# Patient Record
Sex: MALE | Race: WHITE | Employment: UNEMPLOYED | URBAN - METROPOLITAN AREA
[De-identification: names, ages, dates, MRNs, and addresses within clinical notes are randomized per-mention and may not be internally consistent; named-entity substitution may affect disease eponyms.]

---

## 2017-02-02 ENCOUNTER — OFFICE VISIT (OUTPATIENT)
Dept: FAMILY MEDICINE CLINIC | Age: 22
End: 2017-02-02

## 2017-02-02 VITALS
SYSTOLIC BLOOD PRESSURE: 133 MMHG | HEART RATE: 87 BPM | TEMPERATURE: 98 F | DIASTOLIC BLOOD PRESSURE: 77 MMHG | BODY MASS INDEX: 16.5 KG/M2 | OXYGEN SATURATION: 96 % | WEIGHT: 128.6 LBS | HEIGHT: 74 IN

## 2017-02-02 DIAGNOSIS — E55.9 VITAMIN D DEFICIENCY: ICD-10-CM

## 2017-02-02 DIAGNOSIS — Z87.11 HX OF GASTRIC ULCER: ICD-10-CM

## 2017-02-02 DIAGNOSIS — Z00.00 WELL ADULT EXAM: Primary | ICD-10-CM

## 2017-02-02 DIAGNOSIS — R63.6 UNDERWEIGHT: ICD-10-CM

## 2017-02-02 RX ORDER — EPINEPHRINE 0.15 MG/.3ML
0.15 INJECTION INTRAMUSCULAR
COMMUNITY
End: 2018-10-03 | Stop reason: DRUGHIGH

## 2017-02-02 RX ORDER — PHENOL/SODIUM PHENOLATE
AEROSOL, SPRAY (ML) MUCOUS MEMBRANE
Qty: 38 TAB | Refills: 0 | Status: SHIPPED | OUTPATIENT
Start: 2017-02-02 | End: 2018-09-24

## 2017-02-02 RX ORDER — OMEPRAZOLE/SODIUM BICARBONATE 40; 1680 MG/1; MG/1
POWDER, FOR SUSPENSION ORAL
COMMUNITY
End: 2017-02-02 | Stop reason: ALTCHOICE

## 2017-02-02 NOTE — PATIENT INSTRUCTIONS
Well Visit, Ages 25 to 48: Care Instructions  Your Care Instructions  Physical exams can help you stay healthy. Your doctor has checked your overall health and may have suggested ways to take good care of yourself. He or she also may have recommended tests. At home, you can help prevent illness with healthy eating, regular exercise, and other steps. Follow-up care is a key part of your treatment and safety. Be sure to make and go to all appointments, and call your doctor if you are having problems. It's also a good idea to know your test results and keep a list of the medicines you take. How can you care for yourself at home? · Reach and stay at a healthy weight. This will lower your risk for many problems, such as obesity, diabetes, heart disease, and high blood pressure. · Get at least 30 minutes of physical activity on most days of the week. Walking is a good choice. You also may want to do other activities, such as running, swimming, cycling, or playing tennis or team sports. Discuss any changes in your exercise program with your doctor. · Do not smoke or allow others to smoke around you. If you need help quitting, talk to your doctor about stop-smoking programs and medicines. These can increase your chances of quitting for good. · Talk to your doctor about whether you have any risk factors for sexually transmitted infections (STIs). Having one sex partner (who does not have STIs and does not have sex with anyone else) is a good way to avoid these infections. · Use birth control if you do not want to have children at this time. Talk with your doctor about the choices available and what might be best for you. · Protect your skin from too much sun. When you're outdoors from 10 a.m. to 4 p.m., stay in the shade or cover up with clothing and a hat with a wide brim. Wear sunglasses that block UV rays. Even when it's cloudy, put broad-spectrum sunscreen (SPF 30 or higher) on any exposed skin.   · See a dentist one or two times a year for checkups and to have your teeth cleaned. · Wear a seat belt in the car. · Drink alcohol in moderation, if at all. That means no more than 2 drinks a day for men and 1 drink a day for women. Follow your doctor's advice about when to have certain tests. These tests can spot problems early. For everyone  · Cholesterol. Have the fat (cholesterol) in your blood tested after age 21. Your doctor will tell you how often to have this done based on your age, family history, or other things that can increase your risk for heart disease. · Blood pressure. Have your blood pressure checked during a routine doctor visit. Your doctor will tell you how often to check your blood pressure based on your age, your blood pressure results, and other factors. · Vision. Talk with your doctor about how often to have a glaucoma test.  · Diabetes. Ask your doctor whether you should have tests for diabetes. · Colon cancer. Have a test for colon cancer at age 48. You may have one of several tests. If you are younger than 48, you may need a test earlier if you have any risk factors. Risk factors include whether you already had a precancerous polyp removed from your colon or whether your parent, brother, sister, or child has had colon cancer. For women  · Breast exam and mammogram. Talk to your doctor about when you should have a clinical breast exam and a mammogram. Medical experts differ on whether and how often women under 50 should have these tests. Your doctor can help you decide what is right for you. · Pap test and pelvic exam. Begin Pap tests at age 24. A Pap test is the best way to find cervical cancer. The test often is part of a pelvic exam. Ask how often to have this test.  · Tests for sexually transmitted infections (STIs). Ask whether you should have tests for STIs. You may be at risk if you have sex with more than one person, especially if your partners do not wear condoms.   For men  · Tests for sexually transmitted infections (STIs). Ask whether you should have tests for STIs. You may be at risk if you have sex with more than one person, especially if you do not wear a condom. · Testicular cancer exam. Ask your doctor whether you should check your testicles regularly. · Prostate exam. Talk to your doctor about whether you should have a blood test (called a PSA test) for prostate cancer. Experts differ on whether and when men should have this test. Some experts suggest it if you are older than 39 and are -American or have a father or brother who got prostate cancer when he was younger than 72. When should you call for help? Watch closely for changes in your health, and be sure to contact your doctor if you have any problems or symptoms that concern you. Where can you learn more? Go to http://sandra-ruthann.info/. Enter P072 in the search box to learn more about \"Well Visit, Ages 25 to 48: Care Instructions. \"  Current as of: July 19, 2016  Content Version: 11.1  © 4422-0686 [employer]. Care instructions adapted under license by [employer] (which disclaims liability or warranty for this information). If you have questions about a medical condition or this instruction, always ask your healthcare professional. [place] [address] any warranty or liability for your use of this information. Learning About Vitamin D  Why is it important to get enough vitamin D? Your body needs vitamin D to absorb calcium. Calcium keeps your bones and muscles, including your heart, healthy and strong. If your muscles don't get enough calcium, they can cramp, hurt, or feel weak. You may have long-term (chronic) muscle aches and pains. If you don't get enough vitamin D throughout life, you have an increased chance of having thin and brittle bones (osteoporosis) in your later years.  Children who don't get enough vitamin D may not grow as much as others their age. They also have a chance of getting a rare disease called rickets. It causes weak bones. Vitamin D and calcium are added to many foods. And your body uses sunshine to make its own vitamin D. How much vitamin D do you need? The [place] [employer] recommends that people ages 3 through 79 get 600 IU (international units) every day. Adults 71 and older need 800 IU every day. Blood tests for vitamin D can check your vitamin D level. But there is no standard normal range used by all laboratories. The [place] [employer] recommends a blood level of 20 ng/mL of vitamin D for healthy bones. And most people in the [place] and [facility] ([facility]) meet this goal.  How can you get more vitamin D? Foods that contain vitamin D include:  · [place], tuna, and mackerel. These are some of the best foods to eat when you need to get more vitamin D.  · Cheese, egg yolks, and beef liver. These foods have vitamin D in small amounts. · Milk, soy drinks, orange juice, yogurt, margarine, and some kinds of cereal have vitamin D added to them. Some people don't make vitamin D as well as others. They may have to take extra care in getting enough vitamin D. Things that reduce how much vitamin D your body makes include:  · Dark skin, such as many  Americans have. · Age, especially if you are older than 72. · Digestive problems, such as Crohn's or celiac disease. · Liver and kidney disease. Some people who do not get enough vitamin D may need supplements. Are there any risks from taking vitamin D?  · Too much vitamin D:  ¨ Can damage your kidneys. ¨ Can cause nausea and vomiting, constipation, and weakness. ¨ Raises the amount of calcium in your blood. If this happens, you can get confused or have an irregular heart rhythm. · Vitamin D may interact with other medicines. Tell your doctor about all of the medicines you take, including over-the-counter drugs, herbs, and pills.  Tell your doctor about all of your current medical problems. Where can you learn more? Go to http://sandra-ruthann.info/. Enter [id number] in the search box to learn more about \"Learning About Vitamin D.\"  Current as of: July 26, 2016  Content Version: 11.1  © 9483-8962 [employer]. Care instructions adapted under license by [employer] (which disclaims liability or warranty for this information). If you have questions about a medical condition or this instruction, always ask your healthcare professional. [place] [address] any warranty or liability for your use of this information. Learning About the HPV Vaccine  What is the HPV vaccine? The HPV (human papillomavirus) vaccine protects against HPV. HPV is a common sexually transmitted infection (STI). There are many types of HPV. Some types of the virus can cause genital warts. Other types can cause cervical and some uncommon cancers, such as anal and vaginal cancer. Cervarix, Gardasil, and Gardasil 9 are the three types of HPV vaccines. They protect against the most common HPV types that can cause serious problems. HPV vaccines are given as a series of 3 shots: a first shot followed by a second shot 1 to 2 months later, and a third shot 6 months after the first shot. Who should get the vaccine? Experts recommend that girls ages 6 to 15 get the HPV vaccine. It can be given to girls starting at age 5 or 8. It's also recommended for girls and young women ages 15 to 32 who didn't get the vaccine when they were younger. It isn't recommended for women who are pregnant. Experts recommend that the HPV vaccine be given to boys ages 6 to 15. The series can be started at age 5 or 8. Luisa Goldman men ages 15 to 24 need to get this vaccine if they didn't get it when they were younger. It is also recommended for men ages 25 to 32 who have a weak immune system or who have sex with men. What else do you need to know?   The best time for a person to get the vaccine is before becoming sexually active. This is because the vaccine works best before there is any chance of infection with HPV. When the vaccine is given at this time, it can prevent almost all infection by the types of HPV the vaccine guards against. If the person has already been infected with the virus, the vaccine may not provide protection against the virus. Having the HPV vaccine does not change your need for Pap tests. Women who have had the HPV vaccine should follow the same Pap test schedule as women who have not had the vaccine. If you are a parent of a child who's getting the shot, talk to your child about HPV and the vaccine. It's a chance to teach your child about safer sex and STIs. Having your child get the shot doesn't mean you're giving your child permission to have sex. The vaccine can have side effects. Common side effects from the vaccine include headache, fever, and redness or swelling at the site of the shot. More serious side effects, such as fainting, are rare. · Take an over-the-counter pain medicine, such as acetaminophen (Tylenol) or ibuprofen (Advil, Motrin), to relieve common side effects. Read and follow all instructions on the label. · Put ice or a cold pack on the sore area for 10 to 20 minutes at a time. Put a thin cloth between the ice and your skin. Where can you learn more? Go to http://sandra-ruthann.info/. Enter P170 in the search box to learn more about \"Learning About the HPV Vaccine. \"  Current as of: February 9, 2016  Content Version: 11.1  © 7969-3785 [employer], [facility]. Care instructions adapted under license by [employer] (which disclaims liability or warranty for this information). If you have questions about a medical condition or this instruction, always ask your healthcare professional. [address] any warranty or liability for your use of this information.

## 2017-02-02 NOTE — PROGRESS NOTES
Kavita Bourgeois  25 y.o. male  1995  [address] [facility]  <[id number]>   [address]: Progress Note  Vince Ramirez MD       Encounter Date: 2/2/2017    Chief Complaint   Patient presents with    Establish Care     History of Present Illness   Kavita Bourgeois is a 25 y.o. male who presents to clinic today for to establish care. 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 Has been on PPIs for GERD/gastric ulcer. He would like to come off this medication and is requesting guidance on this. Currently is asymptomatic. Has had prior work-up for connective tissue disorders. Review of Systems   Review of Systems   Constitutional: Negative for chills and fever. HENT: Negative for congestion and sore throat. Eyes: Negative for blurred vision and double vision. Respiratory: Negative for cough, shortness of breath and wheezing. Cardiovascular: Negative for chest pain and palpitations. Gastrointestinal: Negative for abdominal pain, constipation, diarrhea and nausea. Genitourinary: Negative for dysuria and hematuria. Musculoskeletal: Negative for back pain, falls and myalgias. Skin: Negative for rash. Neurological: Negative for dizziness, tremors and headaches. Psychiatric/Behavioral: Negative for depression. The patient is not nervous/anxious. All other systems reviewed and are negative. 99cm finger tip to spine (198cm finger tip to fingertip)  Vitals/Objective:     Vitals:    02/02/17 1306   BP: 133/77   Pulse: 87   Temp: 98 °F (36.7 °C)   TempSrc: Axillary   SpO2: 96%   Weight: 128 lb 9.6 oz (58.3 kg)   Height: 6' 2.41\" (1.89 m)     Body mass index is 16.33 kg/(m^2). Physical Exam   Constitutional: He is oriented to person, place, and time. He appears well-developed and well-nourished. No distress. HENT:   Head: Normocephalic and atraumatic.    Right Ear: Tympanic membrane and ear canal normal. Left Ear: Tympanic membrane and ear canal normal.   Nose: Nose normal.   Mouth/Throat: No oropharyngeal exudate. Eyes: Conjunctivae and EOM are normal. Pupils are equal, round, and reactive to light. No scleral icterus. Neck: Normal range of motion. Neck supple. No thyromegaly present. Cardiovascular: Normal rate, regular rhythm, normal heart sounds and intact distal pulses. Exam reveals no gallop and no friction rub. No murmur heard. Pulmonary/Chest: Effort normal and breath sounds normal. No respiratory distress. He has no wheezes. He has no rhonchi. He has no rales. Abdominal: Soft. Bowel sounds are normal. He exhibits no distension. There is no hepatosplenomegaly. There is no tenderness. There is no CVA tenderness. Musculoskeletal: Normal range of motion. Lymphadenopathy:     He has no cervical adenopathy. Neurological: He is alert and oriented to person, place, and time. He has normal reflexes. No cranial nerve deficit or sensory deficit. He exhibits normal muscle tone. Skin: Skin is warm. No rash noted. He is not diaphoretic. Psychiatric: He has a normal mood and affect. His behavior is normal. Judgment and thought content normal.   Vitals reviewed. Assessment and Plan:   1. Encounter for routine child health examination without abnormal findings  - Omeprazole delayed release (PRILOSEC D/R) 20 mg tablet; Take daily for 2 weeks then every other day for 4 weeks then every third day for 4 weeks then stop  Dispense: 38 Tab; Refill: 0  - CBC W/O DIFF  - METABOLIC PANEL, COMPREHENSIVE    2. Hx of gastric ulcer  - Omeprazole delayed release (PRILOSEC D/R) 20 mg tablet; Take daily for 2 weeks then every other day for 4 weeks then every third day for 4 weeks then stop  Dispense: 38 Tab; Refill: 0    3. Underweight  - CBC W/O DIFF  - METABOLIC PANEL, COMPREHENSIVE    4.  Vitamin D deficiency  - VITAMIN D, 25 HYDROXY    I have discussed the diagnosis with the patient and the intended plan as seen in the above orders. he has expressed understanding. The patient has received an after-visit summary and questions were answered concerning future plans. I have discussed medication side effects and warnings with the patient as well. Follow-up Disposition:  Return if symptoms worsen or fail to improve. Electronically Signed: Rey Self MD     History   Patients past medical, surgical and family histories were reviewed and updated. 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 No pertinent past surgical history. Family History   Problem Relation Age of Onset    Anxiety Mother     Depression Mother     Hypertension Father     Sleep Apnea Father     Heart Disease Father     Dementia Maternal Grandmother      Social History     Social History    Marital status: UNKNOWN     Spouse name: N/A    Number of children: N/A    Years of education: N/A     Occupational History    Not on file.      Social History Main Topics    Smoking status: Never Smoker    Smokeless tobacco: Not on file    Alcohol use Not on file    Drug use: Not on file    Sexual activity: Not on file     Other Topics Concern    Not on file     Social History Narrative    No narrative on file            Current Medications/Allergies     Allergies   Allergen Reactions    Eggshell Membrane Swelling    Hazelnut Other (comments)

## 2017-02-02 NOTE — MR AVS SNAPSHOT
Visit Information Date & Time Provider Department Dept. Phone Encounter #  
 2/2/2017  1:00 PM Vince Ramirez, [facility]5 [facility] [phone] [id number] Follow-up Instructions Return if symptoms worsen or fail to improve. Upcoming Health Maintenance Date Due  
 HPV AGE 9Y-34Y (1 of 3 - Male 3 Dose Series) 2/3/2008 INFLUENZA AGE 9 TO ADULT 8/1/2016 DTaP/Tdap/Td series (7 - Td) 1/14/2019 Allergies as of 2/2/2017  Review Complete On: 2/2/2017 By: Vince Ramirez MD  
  
 Severity Noted Reaction Type Reactions Eggshell Membrane High 02/02/2017    Swelling Hazelnut  02/02/2017   Topical Other (comments) Current Immunizations  Never Reviewed Name Date DTaP 4/13/2001, 7/6/2000, 4/13/1998, 1997, 1997 Hep A Vaccine 1/14/2009, 8/3/2006 Hep B Vaccine 1997, 1997, 1997 Hib 7/6/2000, 4/13/1998, 1997, 1997 MMR 4/13/2001, 4/13/1998 Meningococcal (MCV4P) Vaccine 2/6/2013, 1/14/2009 Poliovirus vaccine 4/13/2001, 1997, 1997, 1997 Tdap 1/14/2009 Varicella Virus Vaccine 6/9/2010, 7/10/1998 Not reviewed this visit You Were Diagnosed With   
  
 Codes Comments Encounter for routine child health examination without abnormal findings    -  Primary ICD-10-CM: W49.361 ICD-9-CM: V20.2 Hx of gastric ulcer     ICD-10-CM: Z87.19 ICD-9-CM: V12.79 Underweight     ICD-10-CM: R63.6 ICD-9-CM: 783.22 Vitamin D deficiency     ICD-10-CM: E55.9 ICD-9-CM: 268.9 Vitals BP Pulse Temp Height(growth percentile) Weight(growth percentile) SpO2  
 133/77 (74 %/ 34 %)* 87 98 °F (36.7 °C) (Axillary) 6' 2.41\" (1.89 m) (96 %, Z= 1.71) 128 lb 9.6 oz (58.3 kg) (10 %, Z= -1.29) 96% BMI Smoking Status 16.33 kg/m2 (<1 %, Z= -3.56) Never Smoker *BP percentiles are based on NHBPEP's 4th Report Growth percentiles are based on [facility] 2-20 Years data. BMI and BSA Data Body Mass Index Body Surface Area  
 16.33 kg/m 2 1.75 m 2 Preferred Pharmacy Pharmacy Name Phone [facility] [address], [facility] [address]  [facility] [phone] Your Updated Medication List  
  
   
This list is accurate as of: 2/2/17  2:04 PM.  Always use your most recent med list.  
  
  
  
  
 EPINEPHrine 0.15 mg/0.3 mL injection Commonly known as:  EPIPEN JR  
0.15 mg by IntraMUSCular route once as needed. Omeprazole delayed release 20 mg tablet Commonly known as:  PRILOSEC D/R Take daily for 2 weeks then every other day for 4 weeks then every third day for 4 weeks then stop Prescriptions Sent to Pharmacy Refills Omeprazole delayed release (PRILOSEC D/R) 20 mg tablet 0 Sig: Take daily for 2 weeks then every other day for 4 weeks then every third day for 4 weeks then stop Class: Normal  
 Pharmacy: [facility] [address], [place] - [address] AT [facility] [address] #: [phone] We Performed the Following CBC W/O DIFF [13286 CPT(R)] METABOLIC PANEL, COMPREHENSIVE [27856 CPT(R)] VITAMIN D, 25 HYDROXY [id number] CPT(R)] Follow-up Instructions Return if symptoms worsen or fail to improve. Patient Instructions Well Visit, Ages 25 to 48: Care Instructions Your Care Instructions Physical exams can help you stay healthy. Your doctor has checked your overall health and may have suggested ways to take good care of yourself. He or she also may have recommended tests. At home, you can help prevent illness with healthy eating, regular exercise, and other steps. Follow-up care is a key part of your treatment and safety. Be sure to make and go to all appointments, and call your doctor if you are having problems. It's also a good idea to know your test results and keep a list of the medicines you take. How can you care for yourself at home? · Reach and stay at a healthy weight. This will lower your risk for many problems, such as obesity, diabetes, heart disease, and high blood pressure. · Get at least 30 minutes of physical activity on most days of the week. Walking is a good choice. You also may want to do other activities, such as running, swimming, cycling, or playing tennis or team sports. Discuss any changes in your exercise program with your doctor. · Do not smoke or allow others to smoke around you. If you need help quitting, talk to your doctor about stop-smoking programs and medicines. These can increase your chances of quitting for good. · Talk to your doctor about whether you have any risk factors for sexually transmitted infections (STIs). Having one sex partner (who does not have STIs and does not have sex with anyone else) is a good way to avoid these infections. · Use birth control if you do not want to have children at this time. Talk with your doctor about the choices available and what might be best for you. · Protect your skin from too much sun. When you're outdoors from 10 a.m. to 4 p.m., stay in the shade or cover up with clothing and a hat with a wide brim. Wear sunglasses that block UV rays. Even when it's cloudy, put broad-spectrum sunscreen (SPF 30 or higher) on any exposed skin. · See a dentist one or two times a year for checkups and to have your teeth cleaned. · Wear a seat belt in the car. · Drink alcohol in moderation, if at all. That means no more than 2 drinks a day for men and 1 drink a day for women. Follow your doctor's advice about when to have certain tests. These tests can spot problems early. For everyone · Cholesterol. Have the fat (cholesterol) in your blood tested after age 21. Your doctor will tell you how often to have this done based on your age, family history, or other things that can increase your risk for heart disease. · Blood pressure. Have your blood pressure checked during a routine doctor visit. Your doctor will tell you how often to check your blood pressure based on your age, your blood pressure results, and other factors. · Vision. Talk with your doctor about how often to have a glaucoma test. 
· Diabetes. Ask your doctor whether you should have tests for diabetes. · Colon cancer. Have a test for colon cancer at age 48. You may have one of several tests. If you are younger than 48, you may need a test earlier if you have any risk factors. Risk factors include whether you already had a precancerous polyp removed from your colon or whether your parent, brother, sister, or child has had colon cancer. For women · Breast exam and mammogram. Talk to your doctor about when you should have a clinical breast exam and a mammogram. Medical experts differ on whether and how often women under 50 should have these tests. Your doctor can help you decide what is right for you. · Pap test and pelvic exam. Begin Pap tests at age 24. A Pap test is the best way to find cervical cancer. The test often is part of a pelvic exam. Ask how often to have this test. 
· Tests for sexually transmitted infections (STIs). Ask whether you should have tests for STIs. You may be at risk if you have sex with more than one person, especially if your partners do not wear condoms. For men · Tests for sexually transmitted infections (STIs). Ask whether you should have tests for STIs. You may be at risk if you have sex with more than one person, especially if you do not wear a condom. · Testicular cancer exam. Ask your doctor whether you should check your testicles regularly. · Prostate exam. Talk to your doctor about whether you should have a blood test (called a PSA test) for prostate cancer.  Experts differ on whether and when men should have this test. Some experts suggest it if you are older than 39 and are -American or have a father or brother who got prostate cancer when he was younger than 72. When should you call for help? Watch closely for changes in your health, and be sure to contact your doctor if you have any problems or symptoms that concern you. Where can you learn more? Go to http://sandra-ruthann.info/. Enter P072 in the search box to learn more about \"Well Visit, Ages 25 to 48: Care Instructions. \" Current as of: July 19, 2016 Content Version: 11.1 © 9251-4128 [employer]. Care instructions adapted under license by [employer] (which disclaims liability or warranty for this information). If you have questions about a medical condition or this instruction, always ask your healthcare professional. [address] any warranty or liability for your use of this information. Learning About Vitamin D Why is it important to get enough vitamin D? Your body needs vitamin D to absorb calcium. Calcium keeps your bones and muscles, including your heart, healthy and strong. If your muscles don't get enough calcium, they can cramp, hurt, or feel weak. You may have long-term (chronic) muscle aches and pains. If you don't get enough vitamin D throughout life, you have an increased chance of having thin and brittle bones (osteoporosis) in your later years. Children who don't get enough vitamin D may not grow as much as others their age. They also have a chance of getting a rare disease called rickets. It causes weak bones. Vitamin D and calcium are added to many foods. And your body uses sunshine to make its own vitamin D. How much vitamin D do you need? The [place] of Medicine recommends that people ages 3 through 79 get 600 IU (international units) every day. Adults 71 and older need 800 IU every day. Blood tests for vitamin D can check your vitamin D level.  But there is no standard normal range used by all laboratories. The [place] of Medicine recommends a blood level of 20 ng/mL of vitamin D for healthy bones. And most people in the [place] and [facility] meet this goal. 
How can you get more vitamin D? Foods that contain vitamin D include: 
· Duarte, tuna, and mackerel. These are some of the best foods to eat when you need to get more vitamin D. 
· Cheese, egg yolks, and beef liver. These foods have vitamin D in small amounts. · Milk, soy drinks, orange juice, yogurt, margarine, and some kinds of cereal have vitamin D added to them. Some people don't make vitamin D as well as others. They may have to take extra care in getting enough vitamin D. Things that reduce how much vitamin D your body makes include: · Dark skin, such as many  Americans have. · Age, especially if you are older than 72. · Digestive problems, such as Crohn's or celiac disease. · Liver and kidney disease. Some people who do not get enough vitamin D may need supplements. Are there any risks from taking vitamin D? 
· Too much vitamin D: 
¨ Can damage your kidneys. ¨ Can cause nausea and vomiting, constipation, and weakness. ¨ Raises the amount of calcium in your blood. If this happens, you can get confused or have an irregular heart rhythm. · Vitamin D may interact with other medicines. Tell your doctor about all of the medicines you take, including over-the-counter drugs, herbs, and pills. Tell your doctor about all of your current medical problems. Where can you learn more? Go to http://sandra-ruthann.info/. Enter [id number] in the search box to learn more about \"Learning About Vitamin D.\" 
Current as of: July 26, 2016 Content Version: 11.1 © 9140-2693 [employer]. Care instructions adapted under license by [employer] (which disclaims liability or warranty for this information).  If you have questions about a medical condition or this instruction, always ask your healthcare professional. [place] [address] any warranty or liability for your use of this information. Learning About the HPV Vaccine What is the HPV vaccine? The HPV (human papillomavirus) vaccine protects against HPV. HPV is a common sexually transmitted infection (STI). There are many types of HPV. Some types of the virus can cause genital warts. Other types can cause cervical and some uncommon cancers, such as anal and vaginal cancer. Cervarix, Gardasil, and Gardasil 9 are the three types of HPV vaccines. They protect against the most common HPV types that can cause serious problems. HPV vaccines are given as a series of 3 shots: a first shot followed by a second shot 1 to 2 months later, and a third shot 6 months after the first shot. Who should get the vaccine? Experts recommend that girls ages 6 to 15 get the HPV vaccine. It can be given to girls starting at age 5 or 8. It's also recommended for girls and young women ages 15 to 32 who didn't get the vaccine when they were younger. It isn't recommended for women who are pregnant. Experts recommend that the HPV vaccine be given to boys ages 6 to 15. The series can be started at age 5 or 8. Shabnam Spann men ages 15 to 24 need to get this vaccine if they didn't get it when they were younger. It is also recommended for men ages 25 to 32 who have a weak immune system or who have sex with men. What else do you need to know? The best time for a person to get the vaccine is before becoming sexually active. This is because the vaccine works best before there is any chance of infection with HPV. When the vaccine is given at this time, it can prevent almost all infection by the types of HPV the vaccine guards against. If the person has already been infected with the virus, the vaccine may not provide protection against the virus. Having the HPV vaccine does not change your need for Pap tests.  Women who have had the HPV vaccine should follow the same Pap test schedule as women who have not had the vaccine. If you are a parent of a child who's getting the shot, talk to your child about HPV and the vaccine. It's a chance to teach your child about safer sex and STIs. Having your child get the shot doesn't mean you're giving your child permission to have sex. The vaccine can have side effects. Common side effects from the vaccine include headache, fever, and redness or swelling at the site of the shot. More serious side effects, such as fainting, are rare. · Take an over-the-counter pain medicine, such as acetaminophen (Tylenol) or ibuprofen (Advil, Motrin), to relieve common side effects. Read and follow all instructions on the label. · Put ice or a cold pack on the sore area for 10 to 20 minutes at a time. Put a thin cloth between the ice and your skin. Where can you learn more? Go to http://sandra-ruthann.info/. Enter M590 in the search box to learn more about \"Learning About the HPV Vaccine. \" Current as of: February 9, 2016 Content Version: 11.1 © 4708-6270 [employer]looks (Coqueta.me). Care instructions adapted under license by [employer] (which disclaims liability or warranty for this information). If you have questions about a medical condition or this instruction, always ask your healthcare professional. [address] any warranty or liability for your use of this information. Introducing \[facility] [place] [facility]\"" & HEALTH SERVICES! Renee Baker introduces [employer] patient portal. Now you can access parts of your medical record, email your doctor's office, and request medication refills online. 1. In your internet browser, go to [email or web address] 2. Click on the First Time User? Click Here link in the Sign In box. You will see the New Member Sign Up page. 3. Enter your [employer] Access Code exactly as it appears below.  You will not need to use this code after youve completed the sign-up process. If you do not sign up before the expiration date, you must request a new code. · [employer] Access Code: [email or web address] Expires: 5/3/2017  2:04 PM 
 
4. Enter the last four digits of your Social Security Number (xxxx) and Date of Birth (mm/dd/yyyy) as indicated and click Submit. You will be taken to the next sign-up page. 5. Create a [employer] ID. This will be your [employer] login ID and cannot be changed, so think of one that is secure and easy to remember. 6. Create a [employer] password. You can change your password at any time. 7. Enter your Password Reset Question and Answer. This can be used at a later time if you forget your password. 8. Enter your e-mail address. You will receive e-mail notification when new information is available in [address]. 9. Click Sign Up. You can now view and download portions of your medical record. 10. Click the Download Summary menu link to download a portable copy of your medical information. If you have questions, please visit the Frequently Asked Questions section of the [employer] website. Remember, [employer] is NOT to be used for urgent needs. For medical emergencies, dial 911. Now available from your iPhone and Android! Please provide this summary of care documentation to your next provider. If you have any questions after today's visit, please call [phone].

## 2017-02-02 NOTE — PROGRESS NOTES
Chief Complaint   Patient presents with   [employer] Establish Care     1. Have you been to the ER, urgent care clinic since your last visit? Hospitalized since your last visit? No    2. Have you seen or consulted any other health care providers outside of the [address] [place] [address] since your last visit? Include any pap smears or colon screening.  No

## 2017-02-02 NOTE — PROGRESS NOTES
Chief Complaint   Patient presents with   [facility]     1. Have you been to the ER, urgent care clinic since your last visit? Hospitalized since your last visit? No    2. Have you seen or consulted any other health care providers outside of the [address] [place] [address] since your last visit? Include any pap smears or colon screening.  No     Just moved to [place]    Wants to check vit d levels    Taking omeprazole --had ulcer surgery 2015--

## 2017-02-03 ENCOUNTER — TELEPHONE (OUTPATIENT)
Dept: FAMILY MEDICINE CLINIC | Age: 22
End: 2017-02-03

## 2017-02-03 LAB
25(OH)D3+25(OH)D2 SERPL-MCNC: 22.2 NG/ML (ref 30–100)
ALBUMIN SERPL-MCNC: 4.7 G/DL (ref 3.5–5.5)
ALBUMIN/GLOB SERPL: 1.9 {RATIO} (ref 1.1–2.5)
ALP SERPL-CCNC: 85 IU/L (ref 39–117)
ALT SERPL-CCNC: 11 IU/L (ref 0–44)
AST SERPL-CCNC: 22 IU/L (ref 0–40)
BILIRUB SERPL-MCNC: 0.3 MG/DL (ref 0–1.2)
BUN SERPL-MCNC: 11 MG/DL (ref 6–20)
BUN/CREAT SERPL: 12 (ref 8–19)
CALCIUM SERPL-MCNC: 10.1 MG/DL (ref 8.7–10.2)
CHLORIDE SERPL-SCNC: 102 MMOL/L (ref 96–106)
CO2 SERPL-SCNC: 26 MMOL/L (ref 18–29)
CREAT SERPL-MCNC: 0.94 MG/DL (ref 0.76–1.27)
ERYTHROCYTE [DISTWIDTH] IN BLOOD BY AUTOMATED COUNT: 13.5 % (ref 12.3–15.4)
GLOBULIN SER CALC-MCNC: 2.5 G/DL (ref 1.5–4.5)
GLUCOSE SERPL-MCNC: 89 MG/DL (ref 65–99)
HCT VFR BLD AUTO: 46 % (ref 37.5–51)
HGB BLD-MCNC: 16.1 G/DL (ref 12.6–17.7)
MCH RBC QN AUTO: 31.6 PG (ref 26.6–33)
MCHC RBC AUTO-ENTMCNC: 35 G/DL (ref 31.5–35.7)
MCV RBC AUTO: 90 FL (ref 79–97)
PLATELET # BLD AUTO: 204 X10E3/UL (ref 150–379)
POTASSIUM SERPL-SCNC: 4.8 MMOL/L (ref 3.5–5.2)
PROT SERPL-MCNC: 7.2 G/DL (ref 6–8.5)
RBC # BLD AUTO: 5.1 X10E6/UL (ref 4.14–5.8)
SODIUM SERPL-SCNC: 144 MMOL/L (ref 134–144)
WBC # BLD AUTO: 5.2 X10E3/UL (ref 3.4–10.8)

## 2017-02-03 RX ORDER — ACETAMINOPHEN 500 MG
2000 TABLET ORAL 2 TIMES DAILY
Qty: 60 CAP | Refills: 1 | Status: SHIPPED | OUTPATIENT
Start: 2017-02-03 | End: 2018-09-24

## 2017-02-03 NOTE — TELEPHONE ENCOUNTER
Please notify Kareem:    1) Happy Birthday  2) His vitamin D level was low and I recommend replacement for the next 8 weeks. A prescription was sent to his pharmacy.

## 2017-05-04 ENCOUNTER — TELEPHONE (OUTPATIENT)
Dept: FAMILY MEDICINE CLINIC | Age: 22
End: 2017-05-04

## 2017-05-04 DIAGNOSIS — R79.89 LOW SERUM VITAMIN D: Primary | ICD-10-CM

## 2017-05-04 NOTE — TELEPHONE ENCOUNTER
Patient's mom Miguel Angel Walker on HIPAA) states that the patient was prescribed Vitamin D and she would like to know if he could get a blood test to make sure that the supplement is working effectively and to check if his levels have increased? Call back # [phone]. Thank you!

## 2018-09-24 ENCOUNTER — APPOINTMENT (OUTPATIENT)
Dept: GENERAL RADIOLOGY | Age: 23
DRG: 166 | End: 2018-09-24
Attending: EMERGENCY MEDICINE
Payer: OTHER GOVERNMENT

## 2018-09-24 ENCOUNTER — APPOINTMENT (OUTPATIENT)
Dept: CT IMAGING | Age: 23
DRG: 166 | End: 2018-09-24
Attending: EMERGENCY MEDICINE
Payer: OTHER GOVERNMENT

## 2018-09-24 ENCOUNTER — APPOINTMENT (OUTPATIENT)
Dept: GENERAL RADIOLOGY | Age: 23
DRG: 166 | End: 2018-09-24
Attending: THORACIC SURGERY (CARDIOTHORACIC VASCULAR SURGERY)
Payer: OTHER GOVERNMENT

## 2018-09-24 ENCOUNTER — HOSPITAL ENCOUNTER (INPATIENT)
Age: 23
LOS: 3 days | Discharge: HOME | DRG: 166 | End: 2018-09-27
Attending: EMERGENCY MEDICINE | Admitting: THORACIC SURGERY (CARDIOTHORACIC VASCULAR SURGERY)
Payer: OTHER GOVERNMENT

## 2018-09-24 ENCOUNTER — OFFICE VISIT (OUTPATIENT)
Dept: FAMILY MEDICINE CLINIC | Age: 23
End: 2018-09-24

## 2018-09-24 VITALS
SYSTOLIC BLOOD PRESSURE: 112 MMHG | WEIGHT: 129 LBS | TEMPERATURE: 97.1 F | BODY MASS INDEX: 16.55 KG/M2 | HEART RATE: 93 BPM | DIASTOLIC BLOOD PRESSURE: 68 MMHG | HEIGHT: 74 IN | RESPIRATION RATE: 18 BRPM | OXYGEN SATURATION: 98 %

## 2018-09-24 DIAGNOSIS — J93.11 PRIMARY SPONTANEOUS PNEUMOTHORAX: Primary | ICD-10-CM

## 2018-09-24 DIAGNOSIS — G89.18 POST-OP PAIN: ICD-10-CM

## 2018-09-24 DIAGNOSIS — Q67.7 PECTUS CARINATUM: ICD-10-CM

## 2018-09-24 DIAGNOSIS — R29.91 MARFANOID HABITUS: ICD-10-CM

## 2018-09-24 PROBLEM — J93.83 SPONTANEOUS PNEUMOTHORAX: Status: ACTIVE | Noted: 2018-09-24

## 2018-09-24 LAB
ABO + RH BLD: NORMAL
ALBUMIN SERPL-MCNC: 4.6 G/DL (ref 3.5–5)
ALBUMIN/GLOB SERPL: 1.3 {RATIO} (ref 1.1–2.2)
ALP SERPL-CCNC: 84 U/L (ref 45–117)
ALT SERPL-CCNC: 25 U/L (ref 12–78)
ANION GAP SERPL CALC-SCNC: 5 MMOL/L (ref 5–15)
AST SERPL-CCNC: 23 U/L (ref 15–37)
ATRIAL RATE: 64 BPM
BASOPHILS # BLD: 0 K/UL (ref 0–0.1)
BASOPHILS NFR BLD: 1 % (ref 0–1)
BILIRUB SERPL-MCNC: 0.5 MG/DL (ref 0.2–1)
BLOOD GROUP ANTIBODIES SERPL: NORMAL
BUN SERPL-MCNC: 14 MG/DL (ref 6–20)
BUN/CREAT SERPL: 15 (ref 12–20)
CALCIUM SERPL-MCNC: 9 MG/DL (ref 8.5–10.1)
CALCULATED P AXIS, ECG09: 66 DEGREES
CALCULATED R AXIS, ECG10: 77 DEGREES
CALCULATED T AXIS, ECG11: 75 DEGREES
CHLORIDE SERPL-SCNC: 104 MMOL/L (ref 97–108)
CO2 SERPL-SCNC: 29 MMOL/L (ref 21–32)
COMMENT, HOLDF: NORMAL
CREAT SERPL-MCNC: 0.96 MG/DL (ref 0.7–1.3)
DIAGNOSIS, 93000: NORMAL
DIFFERENTIAL METHOD BLD: NORMAL
EOSINOPHIL # BLD: 0.1 K/UL (ref 0–0.4)
EOSINOPHIL NFR BLD: 2 % (ref 0–7)
ERYTHROCYTE [DISTWIDTH] IN BLOOD BY AUTOMATED COUNT: 11.9 % (ref 11.5–14.5)
GLOBULIN SER CALC-MCNC: 3.6 G/DL (ref 2–4)
GLUCOSE BLD STRIP.AUTO-MCNC: 100 MG/DL (ref 65–100)
GLUCOSE SERPL-MCNC: 80 MG/DL (ref 65–100)
HCT VFR BLD AUTO: 47.6 % (ref 36.6–50.3)
HGB BLD-MCNC: 16.3 G/DL (ref 12.1–17)
IMM GRANULOCYTES # BLD: 0 K/UL (ref 0–0.04)
IMM GRANULOCYTES NFR BLD AUTO: 0 % (ref 0–0.5)
LYMPHOCYTES # BLD: 1.8 K/UL (ref 0.8–3.5)
LYMPHOCYTES NFR BLD: 29 % (ref 12–49)
MCH RBC QN AUTO: 31.3 PG (ref 26–34)
MCHC RBC AUTO-ENTMCNC: 34.2 G/DL (ref 30–36.5)
MCV RBC AUTO: 91.4 FL (ref 80–99)
MONOCYTES # BLD: 0.4 K/UL (ref 0–1)
MONOCYTES NFR BLD: 6 % (ref 5–13)
NEUTS SEG # BLD: 3.9 K/UL (ref 1.8–8)
NEUTS SEG NFR BLD: 62 % (ref 32–75)
NRBC # BLD: 0 K/UL (ref 0–0.01)
NRBC BLD-RTO: 0 PER 100 WBC
P-R INTERVAL, ECG05: 116 MS
PLATELET # BLD AUTO: 222 K/UL (ref 150–400)
PMV BLD AUTO: 10.6 FL (ref 8.9–12.9)
POTASSIUM SERPL-SCNC: 4 MMOL/L (ref 3.5–5.1)
PROT SERPL-MCNC: 8.2 G/DL (ref 6.4–8.2)
Q-T INTERVAL, ECG07: 376 MS
QRS DURATION, ECG06: 78 MS
QTC CALCULATION (BEZET), ECG08: 387 MS
RBC # BLD AUTO: 5.21 M/UL (ref 4.1–5.7)
SAMPLES BEING HELD,HOLD: NORMAL
SERVICE CMNT-IMP: NORMAL
SODIUM SERPL-SCNC: 138 MMOL/L (ref 136–145)
SPECIMEN EXP DATE BLD: NORMAL
VENTRICULAR RATE, ECG03: 64 BPM
WBC # BLD AUTO: 6.2 K/UL (ref 4.1–11.1)

## 2018-09-24 PROCEDURE — 71045 X-RAY EXAM CHEST 1 VIEW: CPT

## 2018-09-24 PROCEDURE — 74011250636 HC RX REV CODE- 250/636: Performed by: EMERGENCY MEDICINE

## 2018-09-24 PROCEDURE — 36415 COLL VENOUS BLD VENIPUNCTURE: CPT | Performed by: THORACIC SURGERY (CARDIOTHORACIC VASCULAR SURGERY)

## 2018-09-24 PROCEDURE — 71250 CT THORAX DX C-: CPT

## 2018-09-24 PROCEDURE — 93005 ELECTROCARDIOGRAM TRACING: CPT

## 2018-09-24 PROCEDURE — 75810000165 HC THORACENTESIS

## 2018-09-24 PROCEDURE — 75810000062 HC CHEST TUBE INSERTION

## 2018-09-24 PROCEDURE — 77030037656 HC DRN CHST TU PLEURAGLD KT GTNG -B

## 2018-09-24 PROCEDURE — 65660000000 HC RM CCU STEPDOWN

## 2018-09-24 PROCEDURE — 96361 HYDRATE IV INFUSION ADD-ON: CPT

## 2018-09-24 PROCEDURE — 77030012390 HC DRN CHST BTL GTNG -B

## 2018-09-24 PROCEDURE — 96374 THER/PROPH/DIAG INJ IV PUSH: CPT

## 2018-09-24 PROCEDURE — 82962 GLUCOSE BLOOD TEST: CPT

## 2018-09-24 PROCEDURE — 74011250636 HC RX REV CODE- 250/636: Performed by: THORACIC SURGERY (CARDIOTHORACIC VASCULAR SURGERY)

## 2018-09-24 PROCEDURE — 80053 COMPREHEN METABOLIC PANEL: CPT | Performed by: EMERGENCY MEDICINE

## 2018-09-24 PROCEDURE — 0W9B30Z DRAINAGE OF LEFT PLEURAL CAVITY WITH DRAINAGE DEVICE, PERCUTANEOUS APPROACH: ICD-10-PCS | Performed by: EMERGENCY MEDICINE

## 2018-09-24 PROCEDURE — 85025 COMPLETE CBC W/AUTO DIFF WBC: CPT | Performed by: EMERGENCY MEDICINE

## 2018-09-24 PROCEDURE — C1729 CATH, DRAINAGE: HCPCS

## 2018-09-24 PROCEDURE — 99285 EMERGENCY DEPT VISIT HI MDM: CPT

## 2018-09-24 PROCEDURE — 86900 BLOOD TYPING SEROLOGIC ABO: CPT | Performed by: THORACIC SURGERY (CARDIOTHORACIC VASCULAR SURGERY)

## 2018-09-24 RX ORDER — MORPHINE SULFATE 2 MG/ML
2 INJECTION, SOLUTION INTRAMUSCULAR; INTRAVENOUS
Status: COMPLETED | OUTPATIENT
Start: 2018-09-24 | End: 2018-09-24

## 2018-09-24 RX ORDER — ONDANSETRON 2 MG/ML
4 INJECTION INTRAMUSCULAR; INTRAVENOUS
Status: DISCONTINUED | OUTPATIENT
Start: 2018-09-24 | End: 2018-09-27 | Stop reason: HOSPADM

## 2018-09-24 RX ORDER — LIDOCAINE HYDROCHLORIDE 10 MG/ML
10 INJECTION INFILTRATION; PERINEURAL
Status: COMPLETED | OUTPATIENT
Start: 2018-09-24 | End: 2018-09-24

## 2018-09-24 RX ORDER — KETOROLAC TROMETHAMINE 30 MG/ML
15 INJECTION, SOLUTION INTRAMUSCULAR; INTRAVENOUS
Status: COMPLETED | OUTPATIENT
Start: 2018-09-24 | End: 2018-09-24

## 2018-09-24 RX ORDER — OXYCODONE AND ACETAMINOPHEN 5; 325 MG/1; MG/1
2 TABLET ORAL
Status: DISCONTINUED | OUTPATIENT
Start: 2018-09-24 | End: 2018-09-27

## 2018-09-24 RX ORDER — ACETAMINOPHEN 10 MG/ML
1000 INJECTION, SOLUTION INTRAVENOUS EVERY 6 HOURS
Status: DISCONTINUED | OUTPATIENT
Start: 2018-09-24 | End: 2018-09-25

## 2018-09-24 RX ORDER — KETOROLAC TROMETHAMINE 30 MG/ML
30 INJECTION, SOLUTION INTRAMUSCULAR; INTRAVENOUS ONCE
Status: COMPLETED | OUTPATIENT
Start: 2018-09-24 | End: 2018-09-24

## 2018-09-24 RX ORDER — SODIUM CHLORIDE 0.9 % (FLUSH) 0.9 %
10 SYRINGE (ML) INJECTION
Status: COMPLETED | OUTPATIENT
Start: 2018-09-24 | End: 2018-09-24

## 2018-09-24 RX ORDER — FENTANYL CITRATE 50 UG/ML
25 INJECTION, SOLUTION INTRAMUSCULAR; INTRAVENOUS
Status: DISCONTINUED | OUTPATIENT
Start: 2018-09-24 | End: 2018-09-25

## 2018-09-24 RX ADMIN — SODIUM CHLORIDE 1000 ML: 900 INJECTION, SOLUTION INTRAVENOUS at 16:53

## 2018-09-24 RX ADMIN — MORPHINE SULFATE 2 MG: 2 INJECTION, SOLUTION INTRAMUSCULAR; INTRAVENOUS at 18:18

## 2018-09-24 RX ADMIN — SODIUM CHLORIDE 1000 ML: 900 INJECTION, SOLUTION INTRAVENOUS at 19:46

## 2018-09-24 RX ADMIN — MORPHINE SULFATE 2 MG: 2 INJECTION, SOLUTION INTRAMUSCULAR; INTRAVENOUS at 18:57

## 2018-09-24 RX ADMIN — KETOROLAC TROMETHAMINE 15 MG: 30 INJECTION, SOLUTION INTRAMUSCULAR at 20:47

## 2018-09-24 RX ADMIN — LIDOCAINE HYDROCHLORIDE 10 ML: 10 INJECTION, SOLUTION INFILTRATION; PERINEURAL at 17:30

## 2018-09-24 RX ADMIN — SODIUM CHLORIDE 1000 ML: 900 INJECTION, SOLUTION INTRAVENOUS at 16:54

## 2018-09-24 RX ADMIN — KETOROLAC TROMETHAMINE 30 MG: 30 INJECTION, SOLUTION INTRAMUSCULAR at 23:21

## 2018-09-24 RX ADMIN — Medication 10 ML: at 19:43

## 2018-09-24 NOTE — PROGRESS NOTES
[address] Office [phone], Fax [phone] Subjective: Chief Complaint Patient presents with  Rib Pain  
  left History provided by patient HPI: 
Gayle Zacarias is a 26 y.o. WHITE OR  male presents for LLQ abdominal/rib pain. Significant history includes hx of ulcer s/p surgery and pectus carinatum. LUQ-LLQ abdominal/rib pain  
- Started about 1.5 week as gas pressure (took magnesium, thought it was better), and for the past 3 days, it has gotten worse and is mainly exacerbated by walking or lifting. Denies fever/chills, constipation/diarrhea, n/v, trauma, heavy lifting, change in activity.  
- In 2015, pt had an stomach/GI ulcer with perforation and had emergent surgery in [place]. Negative H pylori. Pt had normal EGD on August 2015 at [facility].  
 
Medication reviewed. Allergy reviewed. ROS (bolded are positive):  
General Negative for fever, chills, changes in weight, changes in appetite CV Negative for chest pain, palpitations, edema Respiratory Negative for cough, shortness of breath, wheezing GI Negative for change in bowel habits, abdominal pain, black or bloody stools, nausea or vomiting MSK Negative for back pain, joint pain, muscle pain Neuro Negative for dizziness, headache, confusion, weakness Objective:  
Vitals - reviewed Visit Vitals  /68  Pulse 93  Temp 97.1 °F (36.2 °C) (Oral)  Resp 18  Ht 6' 2.41\" (1.89 m)  Wt 129 lb (58.5 kg)  SpO2 98%  BMI 16.38 kg/m2 Physical exam:  
GEN: NAD. Alert. Well nourished. NECK: Supple; no masses; thyroid normal          
LUNGS: Respirations unlabored; CTAB. no wheeze, rales, rhonchi CARDIOVASCULAR: Regular, rate, and rhythm without murmurs, gallops or rubs ABDOMEN: Soft; NT, ND. +bowel sounds; no rebound tenderness, no guarding. no masses or organomegaly NEUROLOGIC:  No focal neurologic deficits. Strength and sensation grossly intact. MSK: FROM in all extremities (both passive and active). No vertebral tenderness. EXT: Well perfused. No edema. No erythema. SKIN: No obvious rashes or lesions. Pertinent Labs/Studies: CXR personally interpreted: PTX with about 15-20%. Result discussed with radiologist as well. Revised Tiff Criteria: system score: +wrist and thumb sign = 3 pts, pectus carinatum =2 pts, PTX 2pts, reduced US/LS and Incr arm/height and no severe scoliosis = 1pts, facial negative features , negative for myopia, no murmurs appreciated. Total pt >7 = indicates systemic involvement Assessment and orders: ICD-10-CM ICD-9-CM 1. Primary spontaneous pneumothorax J93.11 512.81 XR CHEST PA LAT  
   XR ABD (AP AND ERECT OR DECUB) 2. Pectus carinatum Q67.7 754.82 ECHO COMPLETE STUDY  
   XR CHEST PA LAT 3. 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 Primary spontaneous pneumothorax. Pt came in with LUQ pain with lifting and walking. CXR with mod PTX. Stable. Will send to [facility] for chest tube placement. Xray abd with mild stool impaction. -     XR CHEST PA LAT; Future -     XR ABD (AP AND ERECT OR DECUB); Future 2. Marfanoid habitus. Revised Tiff criteria + for systemic involvement. Will order for ECHO. Will make definitive dx afterwards. +pectus carinatum.   
-     ECHO COMPLETE STUDY -     XR CHEST PA LAT; Future Follow-up Disposition: 
Return if symptoms worsen or fail to improve. Pt was discussed with Dr Stephanie Eller (attending physician). I have reviewed patient medical and social history and medications. I have reviewed pertinent labs results and other data. I have discussed the diagnosis with the patient and the intended plan as seen in the above orders.  The patient has received an after-visit summary and questions were answered concerning future plans. I have discussed medication side effects and warnings with the patient as well. Marc Goodwin MD 
[facility] 09/24/18

## 2018-09-24 NOTE — MR AVS SNAPSHOT
[address] 
[phone] Patient: Celestine Gaytan MRN: [id number] PVV:[id number]/9/[id number] Visit Information Date & Time Provider Department Dept. Phone Encounter #  
 9/24/2018  1:30 PM [facility], [address] [place] [address] [phone] [id number] Follow-up Instructions Return if symptoms worsen or fail to improve. Upcoming Health Maintenance Date Due  
 HPV Age 9Y-34Y (3 of 1 - Male 3 Dose Series) 2/3/2008 Influenza Age 5 to Adult 8/1/2018 DTaP/Tdap/Td series (7 - Td) 1/14/2019 Allergies as of 9/24/2018  Review Complete On: 9/24/2018 By: Albin Rosado LPN Severity Noted Reaction Type Reactions Eggshell Membrane High 02/02/2017    Swelling Hazelnut  02/02/2017   Topical Other (comments) Current Immunizations  Never Reviewed Name Date DTaP 4/13/2001, 7/6/2000, 4/13/1998, 1997, 1997 Hep A Vaccine 1/14/2009, 8/3/2006 Hep B Vaccine 1997, 1997, 1997 Hib 7/6/2000, 4/13/1998, 1997, 1997 MMR 4/13/2001, 4/13/1998 Meningococcal (MCV4P) Vaccine 2/6/2013, 1/14/2009 Poliovirus vaccine 4/13/2001, 1997, 1997, 1997 Tdap 1/14/2009 Varicella Virus Vaccine 6/9/2010, 7/10/1998 Not reviewed this visit You Were Diagnosed With   
  
 Codes Comments LUQ abdominal pain    -  Primary ICD-10-CM: R10.12 ICD-9-CM: 789.02 Pectus carinatum     ICD-10-CM: Q67.7 ICD-9-CM: 754.82 Marfanoid habitus     ICD-10-CM: R29.91 
ICD-9-CM: 781.99 Vitals BP Pulse Temp Resp Height(growth percentile) Weight(growth percentile) 112/68 93 97.1 °F (36.2 °C) (Oral) 18 6' 2.41\" (1.89 m) 129 lb (58.5 kg) SpO2 BMI Smoking Status 98% 16.38 kg/m2 Never Smoker Vitals History BMI and BSA Data Body Mass Index Body Surface Area  
 16.38 kg/m 2 1.75 m 2 Preferred Pharmacy Pharmacy Name Phone [facility] [address], [address] [place], [place] [address]y [address]  [facility] [phone] Your Updated Medication List  
  
   
This list is accurate as of 9/24/18  2:35 PM.  Always use your most recent med list.  
  
  
  
  
 Cholecalciferol (Vitamin D3) 2,000 unit Cap capsule Commonly known as:  VITAMIN D3 Take 2,000 Units by mouth two (2) times a day. EPINEPHrine 0.15 mg/0.3 mL injection Commonly known as:  EPIPEN JR  
0.15 mg by IntraMUSCular route once as needed. Omeprazole delayed release 20 mg tablet Commonly known as:  PRILOSEC D/R Take daily for 2 weeks then every other day for 4 weeks then every third day for 4 weeks then stop We Performed the Following ECHO COMPLETE STUDY [98092 CPT(R)] Follow-up Instructions Return if symptoms worsen or fail to improve. To-Do List   
 09/24/2018 Imaging:  XR ABD (AP AND ERECT OR DECUB)   
  
 09/24/2018 Imaging:  XR CHEST PA LAT Patient Instructions Marfan Syndrome: Care Instructions Your Care Instructions Marfan syndrome causes problems with connective tissues. These tissues include skin, muscles, blood vessels, ligaments, bones, and tendons. They join parts of the body and help hold the body together. Marfan syndrome is caused by a flaw in a gene that helps make connective tissue. The condition is often passed down in families. If someone in the family has Marfan syndrome, his or her close relatives may need to be examined by a doctor who is familiar with it. Marfan syndrome may cause heart or aorta problems, a curved spine, vision trouble, and pain if the nerves are affected. Some people may have mild problems, while others have more serious symptoms. Most people with Marfan syndrome tend to be tall and thin with long arms, legs, fingers, and toes. They usually have loose joints. Heart and blood vessel problems are among the most serious effects of Marfan syndrome. The heart's valves may leak. Part of the aorta may become weak and stretch, forming an aneurysm. The aorta carries blood to the upper and lower body. An aneurysm may cause the aorta to tear. A torn aorta is the most common cause of death in people with Marfan syndrome. Doctors usually can treat the problems caused by Marfan syndrome. For example, you may take medicine to lower your heart rate and blood pressure, which reduces stress on the aorta. Meeting with a genetic doctor or counselor can help you learn more about Marfan syndrome and what it means to your family. Follow-up care is a key part of your treatment and safety. Be sure to make and go to all appointments, and call your doctor if you are having problems. It's also a good idea to know your test results and keep a list of the medicines you take. How can you care for yourself at home? · Take your medicines exactly as prescribed. Call your doctor if you think you are having a problem with your medicine. · Do not smoke. People with Marfan syndrome are already at risk for heart and lung problems. If you need help quitting, talk to your doctor about stop-smoking programs and medicines. These can increase your chances of quitting for good. · Talk to your doctor before starting an exercise program or playing team sports. Contact sports can be dangerous because your blood vessels and joints are weaker than normal. 
· Wear a medical alert bracelet which says that you have Marfan syndrome. You can buy this at most drugstores. · Write or type a list of any conditions you have that are related to Marfan syndrome. Also include a list of medicines you take. Keep these lists in several places, including with you in case of an emergency. When should you call for help? Call 911 anytime you think you may need emergency care. For example, call if:   · You have severe chest, belly, or back pain.  
  · You passed out (lost consciousness).  
  · You have symptoms of a heart attack. These may include: ¨ Chest pain or pressure, or a strange feeling in the chest. 
¨ Sweating. ¨ Shortness of breath. ¨ Nausea or vomiting. ¨ Pain, pressure, or a strange feeling in the back, neck, jaw, or upper belly or in one or both shoulders or arms. ¨ Lightheadedness or sudden weakness. ¨ A fast or irregular heartbeat. After you call 911, the  may tell you to chew 1 adult-strength or 2 to 4 low-dose aspirin. Wait for an ambulance. Do not try to drive yourself.  
 Call your doctor now or seek immediate medical care if: 
  · You have upper back or belly pain.  
  · You are coughing or wheezing.  
  · You have a hoarse voice.  
  · You have a pulsing feeling in your belly or back.  
 Watch closely for changes in your health, and be sure to contact your doctor if: 
  · You have questions about Marfan syndrome. Where can you learn more? Go to http://sandraSynchroruthann.info/. Enter V405 in the search box to learn more about \"Marfan Syndrome: Care Instructions. \" Current as of: November 21, 2017 Content Version: 11.7 © 7115-2359 [employer]ua[employer]. Care instructions adapted under license by [employer] (which disclaims liability or warranty for this information). If you have questions about a medical condition or this instruction, always ask your healthcare professional. [place] [address] any warranty or liability for your use of this information. Introducing [facility]! Wyatt Looney introduces [employer] patient portal. Now you can access parts of your medical record, email your doctor's office, and request medication refills online. 1. In your internet browser, go to [email or web address] 2. Click on the First Time User? Click Here link in the Sign In box.  You will see the New Member Sign Up page. 3. Enter your [employer] Access Code exactly as it appears below. You will not need to use this code after youve completed the sign-up process. If you do not sign up before the expiration date, you must request a new code. · [employer] Access Code: [email or web address] Expires: 12/23/2018  2:31 PM 
 
4. Enter the last four digits of your Social Security Number (xxxx) and Date of Birth (mm/dd/yyyy) as indicated and click Submit. You will be taken to the next sign-up page. 5. Create a MilePointt ID. This will be your [employer] login ID and cannot be changed, so think of one that is secure and easy to remember. 6. Create a [employer] password. You can change your password at any time. 7. Enter your Password Reset Question and Answer. This can be used at a later time if you forget your password. 8. Enter your e-mail address. You will receive e-mail notification when new information is available in [address]. 9. Click Sign Up. You can now view and download portions of your medical record. 10. Click the Download Summary menu link to download a portable copy of your medical information. If you have questions, please visit the Frequently Asked Questions section of the [employer] website. Remember, [employer] is NOT to be used for urgent needs. For medical emergencies, dial 911. Now available from your iPhone and Android! Please provide this summary of care documentation to your next provider. If you have any questions after today's visit, please call [phone].

## 2018-09-24 NOTE — PROGRESS NOTES
Chief Complaint Patient presents with  Rib Pain  
  left 1. Have you been to the ER, urgent care clinic since your last visit? Hospitalized since your last visit? No 
 
2. Have you seen or consulted any other health care providers outside of the [address] [place] [address] since your last visit? Include any pap smears or colon screening.  No

## 2018-09-24 NOTE — IP AVS SNAPSHOT
[address] 
[phone] Patient: Enmanuel Mathews MRN: [id number] LMP:1/8/4830 About your hospitalization You were admitted on:  September 24, 2018 You last received care in the:  [facility] 4 SURG/BARIATRICS You were discharged on:  September 27, 2018 Why you were hospitalized Your primary diagnosis was:  Not on File Your diagnoses also included:  Spontaneous Pneumothorax Follow-up Information Follow up With Details Comments Contact Info Jos Mary MD   [address] 
[phone] Discharge Orders None A check catarino indicates which time of day the medication should be taken. My Medications START taking these medications Instructions Each Dose to Equal  
 Morning Noon Evening Bedtime HYDROcodone-acetaminophen 5-325 mg per tablet Commonly known as:  [place] Your next dose is:  9/27/18   5pm  
   
 Take 1-2 Tabs by mouth every four (4) hours as needed. Max Daily Amount: 12 Tabs. 1-2 Tab  
    
   
   
   
  
 ondansetron 4 mg disintegrating tablet Commonly known as:  ZOFRAN ODT Take 1 Tab by mouth every eight (8) hours as needed for Nausea. Indications: PREVENTION OF POST-OPERATIVE NAUSEA AND VOMITING  
 4 mg CONTINUE taking these medications Instructions Each Dose to Equal  
 Morning Noon Evening Bedtime EPINEPHrine 0.15 mg/0.3 mL injection Commonly known as:  EPIPEN JR  
   
 0.15 mg by IntraMUSCular route once as needed. 0.15 mg Where to Get Your Medications Information on where to get these meds will be given to you by the nurse or doctor. ! Ask your nurse or doctor about these medications HYDROcodone-acetaminophen 5-325 mg per tablet  
 ondansetron 4 mg disintegrating tablet Opioid Education Prescription Opioids: What You Need to Know: 
 
Prescription opioids can be used to help relieve moderate-to-severe pain and are often prescribed following a surgery or injury, or for certain health conditions. These medications can be an important part of treatment but also come with serious risks. Opioids are strong pain medicines. Examples include hydrocodone, oxycodone, fentanyl, and morphine. Heroin is an example of an illegal opioid. It is important to work with your health care provider to make sure you are getting the safest, most effective care. WHAT ARE THE RISKS AND SIDE EFFECTS OF OPIOID USE? Prescription opioids carry serious risks of addiction and overdose, especially with prolonged use. An opioid overdose, often marked by slow breathing, can cause sudden death. The use of prescription opioids can have a number of side effects as well, even when taken as directed. · Tolerance-meaning you might need to take more of a medication for the same pain relief · Physical dependence-meaning you have symptoms of withdrawal when the medication is stopped. Withdrawal symptoms can include nausea, sweating, chills, diarrhea, stomach cramps, and muscle aches. Withdrawal can last up to several weeks, depending on which drug you took and how long you took it. · Increased sensitivity to pain · Constipation · Nausea, vomiting, and dry mouth · Sleepiness and dizziness · Confusion · Depression · Low levels of testosterone that can result in lower sex drive, energy, and strength · Itching and sweating RISKS ARE GREATER WITH:      
· History of drug misuse, substance use disorder, or overdose · Mental health conditions (such as depression or anxiety) · Sleep apnea · Older age (72 years or older) · Pregnancy Avoid alcohol while taking prescription opioids. Also, unless specifically advised by your health care provider, medications to avoid include: · Benzodiazepines (such as Xanax or Valium) · Muscle relaxants (such as Soma or Flexeril) · Hypnotics (such as Ambien or Lunesta) · Other prescription opioids KNOW YOUR OPTIONS Talk to your health care provider about ways to manage your pain that don't involve prescription opioids. Some of these options may actually work better and have fewer risks and side effects. Consult your physician before adding or stopping any medications, treatments, or physical activity. Options may include: 
· Pain relievers such as acetaminophen, ibuprofen, and naproxen · Some medications that are also used for depression or seizures · Physical therapy and exercise · Counseling to help patients learn how to cope better with triggers of pain and stress. · Application of heat or cold compress · Massage therapy · Relaxation techniques Be Informed Make sure you know the name of your medication, how much and how often to take it, and its potential risks & side effects. IF YOU ARE PRESCRIBED OPIOIDS FOR PAIN: 
· Never take opioids in greater amounts or more often than prescribed. Remember the goal is not to be pain-free but to manage your pain at a tolerable level. · Follow up with your primary care provider to: · Work together to create a plan on how to manage your pain. · Talk about ways to help manage your pain that don't involve prescription opioids. · Talk about any and all concerns and side effects. · Help prevent misuse and abuse. · Never sell or share prescription opioids · Help prevent misuse and abuse. · Store prescription opioids in a secure place and out of reach of others (this may include visitors, children, friends, and family). · Safely dispose of unused/unwanted prescription opioids: Find your community drug take-back program or your pharmacy mail-back program, or flush them down the toilet, following guidance from the Food and Drug Administration (www.fda.gov/Drugs/ResourcesForYou). · Visit www.cdc.gov/drugoverdose to learn about the risks of opioid abuse and overdose. · If you believe you may be struggling with addiction, tell your health care provider and ask for guidance or call Elizabeth Tang at 1-498-910-RXBY. Discharge Instructions Acute Pain After Surgery: Care Instructions Your Care Instructions It's common to have some pain after surgery. Pain doesn't mean that something is wrong or that the surgery didn't go well. But when the pain is severe, it's important to work with your doctor to manage it. It's also important to be aware of a few facts about pain and pain medicine. · You are the only person who knows what your pain feels like. So be sure to tell your doctor when you are in pain or when the pain changes. Then he or she will know how to adjust your medicines. · Pain is often easier to control right after it starts. So it may be better to take regular doses of pain medicine and not wait until the pain gets bad. · Medicine can help control pain. But this doesn't mean you'll have no pain. Medicine works to keep the pain at a level you can live with. With time, you will feel better. Follow-up care is a key part of your treatment and safety. Be sure to make and go to all appointments, and call your doctor if you are having problems. It's also a good idea to know your test results and keep a list of the medicines you take. How can you care for yourself at home? · Be safe with medicines. Read and follow all instructions on the label. ¨ If the doctor gave you a prescription medicine for pain, take it as prescribed. ¨ If you are not taking a prescription pain medicine, ask your doctor if you can take an over-the-counter medicine.  
· If you take an over-the-counter pain medicine, such as acetaminophen (Tylenol), ibuprofen (Advil, Motrin), or naproxen (Aleve), read and follow all instructions on the label. · Do not take two or more pain medicines at the same time unless the doctor told you to. · Do not drink alcohol while you are taking pain medicines. · Try to walk each day if your doctor recommends it. Start by walking a little more than you did the day before. Bit by bit, increase the amount you walk. Walking increases blood flow. It also helps prevent pneumonia and constipation. · To prevent constipation from opioid pain medicines: ¨ Talk to your doctor about a laxative. ¨ Include fruits, vegetables, beans, and whole grains in your diet each day. These foods are high in fiber. ¨ Drink plenty of fluids, enough so that your urine is light yellow or clear like water. Drink water, fruit juice, or other drinks that do not contain caffeine or alcohol. If you have kidney, heart, or liver disease and have to limit fluids, talk with your doctor before you increase the amount of fluids you drink. ¨ Take a fiber supplement, such as Citrucel or Metamucil, every day if needed. Read and follow all instructions on the label. If you take pain medicine for more than a few days, talk to your doctor before you take fiber. When should you call for help? Call your doctor now or seek immediate medical care if: 
  · Your pain gets worse.  
  · Your pain is not controlled by medicine.  
 Watch closely for changes in your health, and be sure to contact your doctor if you have any problems. Where can you learn more? Go to http://sandra-ruthann.info/. Enter (52) 997-766 in the search box to learn more about \"Acute Pain After Surgery: Care Instructions. \" Current as of: November 20, 2017 Content Version: 11.7 © 8822-3815 [employer]. Care instructions adapted under license by [employer] (which disclaims liability or warranty for this information).  If you have questions about a medical condition or this instruction, always ask your healthcare professional. [place] [address] any warranty or liability for your use of this information. *DISCHARGE INSTRUCTIONS AFTER LUNG SURGERY [address] [facility] [address], [address] Leave the chest tube dressing in place for 48 hours, then you can remove it and shower. SURGICAL INCISION: 
 
You will have two or three small incisions. These incisions are sealed with sutures that dissolve. The lower incision is from the chest tube. This lower incision will seal itself in 5 to 7 days. Until then you may have drainage from that incision. The drainage will be thin yellowish or red in color and may drain for 5 to 7 days. This is normal and expected. INCISION CARE: 
 
Wash incisions daily with soap. Pat dry. Showers only, no tub baths. If you have drainage, you can place a bandage over the area, otherwise keep open to air. You may experience drainage of clear-strawberry colored fluid from the chest tube site. This is to be expected and will stop in 24-48 hours. PAIN: 
 
What you will experience: ? Tenderness and soreness around incisions ? Burning and/or numbness ? Soreness around front/back of chest 
 
For relief of discomfort: ? Take the pain medicine you were given at discharge ? Aleve one or two tablets every 12 hrs (with food) along with pain medicine (this can be purchased over the counter at your local pharmacy/drug store). Advil may be substituted. Start this after you finish taking Toradol if prescribed. ? Heating pad 10 minutes at a time to the upper incision area as often as you wish. ? For the Ladies: Spandex or elastic sports bra will give you the support you need without pressure on the incision. Most will find this to be a great comfort. COUGHING: 
 
Coughing is helpful after lung surgery.  Place a pillow over your incision and apply pressure when coughing to reduce pain. ACTIVITY: 
 
DO: 1. Walk daily outside if weather permits, at a mall if not. Increase your distance         
             each day. Exercise has many benefits. It promotes healing, expands your lungs,  
             helps you cough, relaxes your body, tones muscles, lowers blood pressure and  
            improves your appetite. After you exercise expect to feel tired and probably short  
             of breath. Plan to take a nap 1-2 times a day to regain your strength . 2. Climb stairs 3. Ride in a car 4. Perform breathing exercises (incentive spirometer) DO NOT: 
            
1. Lift heavy objects (8-10 pounds) 2. Drive a car/truck until after your post-op visit 3. Do heavy yard or housework APPETITE: It is usual to have a decreased appetite after surgery. Exercise is the best stimulant. You may expect to slowly improve over 4-10 days. CALL THE OFFICE IF YOU DEVELOP: 
 
? Increasing pain that is not controlled by the pain medicine. ? Increasing redness or drainage around the incision. ? Unusual or increasing shortness of breath ? Fever greater than 101 degrees ? Change in the color of your sputum to yellow or green, especially if you also have increasing shortness of breath and a fever greater than 101. YOUR MEDICATIONS: 
As instructed FOLLOW-UP APPOINTMENT: 
AFTER DISCHARGE CALL THE OFFICE TO SCHEDULE YOUR VISIT TO SEE US IN 10 to 14 DAYS. Please arrive 45 minutes prior to you appointment time in order to have a chest X-Ray. Check in at [address] [facility] on the ground floor of the [facility]. After your X-ray come to the [address] [place] [address] Road for your appointment. Physician Signature Floresita Morris I have reviewed discharge instructions with the patient and parent. The patient and parent verbalized understanding. Introducing [facility]! [employer] introduces [employer] patient portal. Now you can access parts of your medical record, email your doctor's office, and request medication refills online. 1. In your internet browser, go to [email or web address] 2. Click on the First Time User? Click Here link in the Sign In box. You will see the New Member Sign Up page. 3. Enter your [facility] Access Code exactly as it appears below. You will not need to use this code after youve completed the sign-up process. If you do not sign up before the expiration date, you must request a new code. · [facility] Access Code: [email or web address] Expires: 12/23/2018  2:31 PM 
 
4. Enter the last four digits of your Social Security Number (xxxx) and Date of Birth (mm/dd/yyyy) as indicated and click Submit. You will be taken to the next sign-up page. 5. Create a [facility] ID. This will be your [facility] login ID and cannot be changed, so think of one that is secure and easy to remember. 6. Create a [employer] password. You can change your password at any time. 7. Enter your Password Reset Question and Answer. This can be used at a later time if you forget your password. 8. Enter your e-mail address. You will receive e-mail notification when new information is available in [address] 9. Click Sign Up. You can now view and download portions of your medical record. 10. Click the Download Summary menu link to download a portable copy of your medical information. If you have questions, please visit the Frequently Asked Questions section of the [facility] website. Remember, [employer] [facility] is NOT to be used for urgent needs. For medical emergencies, dial 911. Now available from your iPhone and Android! Introducing Vernon Sullivan As a [employer] patient, I wanted to make you aware of our electronic visit tool called Vernon Sullivan. [employer] 24/7 allows you to connect within minutes with a medical provider 24 hours a day, seven days a week via a mobile device or tablet or logging into a secure website from your computer. You can access [email or web address] from anywhere in the [place]. A virtual visit might be right for you when you have a simple condition and feel like you just dont want to get out of bed, or cant get away from work for an appointment, when your regular [facility] provider is not available (evenings, weekends or holidays), or when youre out of town and need minor care. Electronic visits cost only $49 and if the [employer] 24/CSS99 provider determines a prescription is needed to treat your condition, one can be electronically transmitted to a nearby pharmacy*. Please take a moment to enroll today if you have not already done so. The enrollment process is free and takes just a few minutes. To enroll, please download the Ravti naomi to your tablet or phone, or visit [email or web address] to enroll on your computer. And, as an [address] [place] [address] patient with a Frequency account, the results of your visits will be scanned into your electronic medical record and your primary care provider will be able to view the scanned results. We urge you to continue to see your regular [facility] provider for your ongoing medical care. And while your primary care provider may not be the one available when you seek a Vernon Sullivan virtual visit, the peace of mind you get from getting a real diagnosis real time can be priceless. For more information on Vernon [email or web address], view our Frequently Asked Questions (FAQs) at [email or web address]. Sincerely, 
 
José Miguel Sabillon MD 
Chief Medical Officer Gentry Arciniega *:  certain medications cannot be prescribed via [employer] Providers Seen During Your Hospitalization Provider Specialty Primary office phone Augusto Garcia MD Emergency Medicine [phone] Ajith Torres MD Thoracic (non-cardiac) Surgery [phone] Your Primary Care Physician (PCP) Primary Care Physician Office Phone Office Fax Ministerio Joshi [phone] You are allergic to the following Allergen Reactions Egg Anaphylaxis Eggshell Membrane Swelling Hazelnut Other (comments) Recent Documentation Height Weight BMI Smoking Status 1.88 m 58.6 kg 16.59 kg/m2 Never Smoker Emergency Contacts Name Discharge Info Relation Home Work Mobile Leanna Youssef CAREGIVER [3] Mother [14] [phone] Patient Belongings The following personal items are in your possession at time of discharge: 
  Dental Appliances: None  Visual Aid: Glasses, With patient Discharge Instructions Attachments/References MEFS - HYDROCODONE/ACETAMINOPHEN (VICODIN, Alicia Flor, LORTAB) - (BY MOUTH) (ENGLISH) MEFS - ONDANSETRON (ZOFRAN, ZOFRAN ODT, ZUPLENZ) - (BY MOUTH, INTO THE MOUTH) (ENGLISH) Patient Handouts Hydrocodone/Acetaminophen (Vicodin, Norco, Lortab) - (By mouth) Why this medicine is used:  
Treats pain. Contact a nurse or doctor right away if you have: · Blistering, peeling, red skin rash · Fast or slow heartbeat, shallow breathing, blue lips, fingernails, or skin · Anxiety, restlessness, muscle spasms, twitching, seeing or hearing things that are not there · Dark urine or pale stools, yellow skin or eyes · Extreme weakness, sweating, seizures, cold or clammy skin · Lightheadedness, dizziness, fainting, fever, sweating Common side effects: 
· Constipation, nausea, vomiting, loss of appetite, stomach pain · Tiredness or sleepiness © 2017 [address] Information is for End User's use only and may not be sold, redistributed or otherwise used for commercial purposes. Ondansetron (Zofran, Zofran ODT, Zuplenz) - (By mouth, Into the mouth) Why this medicine is used:  
Prevents nausea and vomiting. Contact a nurse or doctor right away if you have: 
· Fast, pounding, or uneven heartbeat · Lightheadedness or fainting · Trouble breathing Common side effects: 
· Headache, tiredness · Constipation, diarrhea © 2017 [address] Information is for End User's use only and may not be sold, redistributed or otherwise used for commercial purposes. Please provide this summary of care documentation to your next provider. Signatures-by signing, you are acknowledging that this After Visit Summary has been reviewed with you and you have received a copy. Patient Signature:  ____________________________________________________________ Date:  ____________________________________________________________  
  
Aiden Ladd Provider Signature:  ____________________________________________________________ Date:  ____________________________________________________________

## 2018-09-24 NOTE — ED PROVIDER NOTES
HPI Comments: 26 y.o. 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 Patient states that he has been experiencing worsening left-sided chest pain for the past 1.5 weeks. He also complains of associated SOB. Patient claims that he visited his PCP today for his symptoms and was advised to visit the ED due to concern for a \"collapsed left lung\" and \"Marfan Syndrome. \"  Per triage, patient \"vageled\" while starting IV. His mother adds that the patient became dizzy upon being stuck. Patient denies joint dislocation, fever, chills, nausea, vomiting, back pain, headache, diarrhea, constipation, and daily medications. There are no other acute medical concerns at this time. Social hx: negative tobacco, alcohol, and drug use  PCP: No primary care provider on file. Note written by Penny Snow, as dictated by Maggi Corrales MD 4:37 PM          The history is provided by the patient and a parent. No  was used. 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 Family History:   Problem Relation Age of Onset    Anxiety Mother     Depression Mother     Hypertension Father     Sleep Apnea Father     Heart Disease Father     Dementia Maternal Grandmother        Social History     Social History    Marital status: SINGLE     Spouse name: N/A    Number of children: N/A    Years of education: N/A     Occupational History    Not on file.      Social History Main Topics    Smoking status: Never Smoker    Smokeless tobacco: Never Used    Alcohol use No    Drug use: No    Sexual activity: Not on file     Other Topics Concern    Not on file     Social History Narrative         ALLERGIES: Egg; Eggshell membrane; and Hazelnut    Review of Systems   Constitutional: Negative for activity change, chills and fever. HENT: Negative for nosebleeds, sore throat, trouble swallowing and voice change. Eyes: Negative for visual disturbance. Respiratory: Positive for chest tightness and shortness of breath. Cardiovascular: Positive for chest pain. Negative for palpitations. Gastrointestinal: Negative for abdominal pain, constipation, diarrhea and nausea. Genitourinary: Negative for difficulty urinating, dysuria, hematuria and urgency. Musculoskeletal: Negative for back pain, neck pain and neck stiffness. Skin: Negative for color change. Allergic/Immunologic: Negative for immunocompromised state. Neurological: Positive for dizziness. Negative for seizures, syncope, weakness, light-headedness, numbness and headaches. Psychiatric/Behavioral: Negative for behavioral problems, confusion, hallucinations, self-injury and suicidal ideas. Vitals:    09/24/18 1622 09/24/18 1642 09/24/18 1644 09/24/18 1645   BP: 117/62 (!) 59/34 94/52 93/49   Pulse: (!) 104  (!) 112 (!) 108   Resp: 16  13 15   Temp: 97.9 °F (36.6 °C)      SpO2: 98%  100% 100%   Weight: 59.4 kg (131 lb)      Height: 6' 2\" (1.88 m)               Physical Exam   Constitutional: He is oriented to person, place, and time. He appears well-developed and well-nourished. No distress. HENT:   Head: Normocephalic and atraumatic. Eyes: Pupils are equal, round, and reactive to light. Neck: Normal range of motion. Neck supple. Cardiovascular: Normal rate, regular rhythm and normal heart sounds. Exam reveals no gallop and no friction rub. No murmur heard. No JVD; no murmurs, gallops, or rubs    Pulmonary/Chest: Effort normal and breath sounds normal. No respiratory distress. He has no wheezes. Clear breath sounds   Abdominal: Soft. Bowel sounds are normal. He exhibits no distension. There is no tenderness. There is no rebound and no guarding. Musculoskeletal: Normal range of motion.    Neurological: He is alert and oriented to person, place, and time. Skin: Skin is warm. No rash noted. He is not diaphoretic. Psychiatric: He has a normal mood and affect. His behavior is normal. Judgment and thought content normal.   Nursing note and vitals reviewed. Note written by Jordin Gibbons, as dictated by Emeterio Sales MD 4:37 PM      MDM  Number of Diagnoses or Management Options  Primary spontaneous pneumothorax:   Diagnosis management comments: Total critical care time spent exclusive of procedures:  45min    Critical Care  Total time providing critical care: 30-74 minutes        ED Course     This is a 29-year-old male with past medical history, review of systems, physical exam as above, presenting with complaints of left-sided chest pain and pneumothorax. Per the patient, he has been experiencing approximately 3 weeks of gradually worsening left-sided chest pain. He presented to his primary care physician today for evaluation, with chest x-ray showing small to moderately sized left-sided pneumothorax without shift. Upon arrival he is awake, alert, without complaints of shortness of breath. He is just received his IV, he is noted to be diaphoretic, and hypotensive, suspect vagal response. He has clear breath sounds, no JVD, regular rate and rhythm without murmurs gallops or rubs, soft nontender abdomen. Patient's mother states is been suggested he may be marfanoid in appearance, work up pending by his primary care physician. Plan to obtain a repeat chest x-ray, CMP, CBC, placed patient on supplemental oxygen, and consult with thoracic surgery. We'll likely place chest tube here in the emergency department, though will give thoracic surgery the option to place one. I anticipate the patient being admitted for further care and evaluation.     Chest Tube Insertion  Date/Time: 9/24/2018 5:50 PM  Performed by: Nadira Maher by: Jessica Osorio     Consent:     Consent obtained:  Written    Consent given by:  Patient    Risks discussed:  Bleeding, damage to surrounding structures, incomplete drainage, infection, nerve damage and pain    Alternatives discussed:  No treatment and observation  Pre-procedure details:     Skin preparation:  ChloraPrep  Anesthesia (see MAR for exact dosages): Anesthesia method:  Local infiltration    Local anesthetic:  Lidocaine 1% w/o epi  Procedure details:     Placement location:  L lateral    Scalpel size:  11    Tube size (Fr):  28    Ultrasound guidance: no      Tension pneumothorax: no      Tube connected to:  Suction and water seal    Drainage characteristics:  Air only    Suture material:  2-0 silk    Dressing:  4x4 sterile gauze  Post-procedure details:     Post-insertion x-ray findings: tube in good position      Patient tolerance of procedure: Tolerated with difficulty             ED EKG interpretation:  Rhythm: normal sinus rhythm; and regular . Rate (approx.): 64 bpm; Axis: normal; ST/T wave: normal; Normal EKG     Note written by Luis Beltran, as dictated by Jade Contreras MD 4:39 PM    CONSULT NOTE:  5:05 Armen Fuller MD spoke with Dr. Christopher Funez, Consult for Thoracic Surgery. Discussed available diagnostic tests and clinical findings. Dr. Moe Lugo requests that a chest tube be placed. He asks that a non-contrast chest CT of the patient be obtained. He will admit the patient to his service. CONSULT NOTE:  7:52 PM Jade Contreras MD spoke with Dr. Christopher Funez, Consult for Thoracic Surgery. Discussed available diagnostic tests and clinical findings. Dr. Moe Lugo is placing orders for admission of the patient now. 830pm  Called to bedside by nursing for decreased BS and BP, tachycardia, Dr. Saavedra Outlaw at bedside providing support, bedside US without lung slide, suction found not to be on, then large return of air suggestive of leak.   Tube inspected and repaired with new occlusive dressing and dressing, improved VS, bedside CXR with large PTX, VS improved s/p tube repair and redressing.

## 2018-09-24 NOTE — PATIENT INSTRUCTIONS
Marfan Syndrome: Care Instructions Your Care Instructions Marfan syndrome causes problems with connective tissues. These tissues include skin, muscles, blood vessels, ligaments, bones, and tendons. They join parts of the body and help hold the body together. Marfan syndrome is caused by a flaw in a gene that helps make connective tissue. The condition is often passed down in families. If someone in the family has Marfan syndrome, his or her close relatives may need to be examined by a doctor who is familiar with it. Marfan syndrome may cause heart or aorta problems, a curved spine, vision trouble, and pain if the nerves are affected. Some people may have mild problems, while others have more serious symptoms. Most people with Marfan syndrome tend to be tall and thin with long arms, legs, fingers, and toes. They usually have loose joints. Heart and blood vessel problems are among the most serious effects of Marfan syndrome. The heart's valves may leak. Part of the aorta may become weak and stretch, forming an aneurysm. The aorta carries blood to the upper and lower body. An aneurysm may cause the aorta to tear. A torn aorta is the most common cause of death in people with Marfan syndrome. Doctors usually can treat the problems caused by Marfan syndrome. For example, you may take medicine to lower your heart rate and blood pressure, which reduces stress on the aorta. Meeting with a genetic doctor or counselor can help you learn more about Marfan syndrome and what it means to your family. Follow-up care is a key part of your treatment and safety. Be sure to make and go to all appointments, and call your doctor if you are having problems. It's also a good idea to know your test results and keep a list of the medicines you take. How can you care for yourself at home? · Take your medicines exactly as prescribed. Call your doctor if you think you are having a problem with your medicine. · Do not smoke. People with Marfan syndrome are already at risk for heart and lung problems. If you need help quitting, talk to your doctor about stop-smoking programs and medicines. These can increase your chances of quitting for good. · Talk to your doctor before starting an exercise program or playing team sports. Contact sports can be dangerous because your blood vessels and joints are weaker than normal. 
· Wear a medical alert bracelet which says that you have Marfan syndrome. You can buy this at most drugstores. · Write or type a list of any conditions you have that are related to Marfan syndrome. Also include a list of medicines you take. Keep these lists in several places, including with you in case of an emergency. When should you call for help? Call 911 anytime you think you may need emergency care. For example, call if: 
  · You have severe chest, belly, or back pain.  
  · You passed out (lost consciousness).  
  · You have symptoms of a heart attack. These may include: ¨ Chest pain or pressure, or a strange feeling in the chest. 
¨ Sweating. ¨ Shortness of breath. ¨ Nausea or vomiting. ¨ Pain, pressure, or a strange feeling in the back, neck, jaw, or upper belly or in one or both shoulders or arms. ¨ Lightheadedness or sudden weakness. ¨ A fast or irregular heartbeat. After you call 911, the  may tell you to chew 1 adult-strength or 2 to 4 low-dose aspirin. Wait for an ambulance. Do not try to drive yourself.  
 Call your doctor now or seek immediate medical care if: 
  · You have upper back or belly pain.  
  · You are coughing or wheezing.  
  · You have a hoarse voice.  
  · You have a pulsing feeling in your belly or back.  
 Watch closely for changes in your health, and be sure to contact your doctor if: 
  · You have questions about Marfan syndrome. Where can you learn more? Go to http://sandra-ruthann.info/. Enter V405 in the search box to learn more about \"Marfan Syndrome: Care Instructions. \" Current as of: November 21, 2017 Content Version: 11.7 © 4396-3835 [employer]. Care instructions adapted under license by [employer] (which disclaims liability or warranty for this information). If you have questions about a medical condition or this instruction, always ask your healthcare professional. [place] [address] any warranty or liability for your use of this information.

## 2018-09-24 NOTE — ED NOTES
Patient vageled while being stuck in triage. RN to triage. Patient placed on stretcher and brought back to room. Patient placed on monitor x3. Gloria Chairez MD to bedside. Patient responsive to verbal stimuli. . Hypotensive. Orders received for fluid bolus.

## 2018-09-24 NOTE — ED NOTES
RN to bedside. Noted pt HR increased to 140's. BP dropped to 87/34. Pt alert, oriented, and responsive reports feeling very hot. Dr. Mary Ware notified and at bedside. 2 Liters of NS infusing.

## 2018-09-24 NOTE — PROGRESS NOTES
Full consult to follow  25 y/o male presented to the ER with a left spontaneous pneumothorax  Pigtail placed  He appears to have a left apical bleb  Will most likely need a L VATS apical wedge and pleurodesis  Will tentatively post for tomorrow and discuss further with patient in the morning

## 2018-09-24 NOTE — PROGRESS NOTES
Admission Medication Reconciliation:    Information obtained from: Patient and his mother    Significant PMH/Disease States:   Past Medical History:   Diagnosis Date    Anaphylaxis due to eggs     Pectus carinatum        Chief Complaint for this Admission:  Referral / consult    Allergies:  Egg; Eggshell membrane; and Hazelnut    Prior to Admission Medications:   Prior to Admission Medications   Prescriptions Last Dose Informant Patient Reported? Taking? EPINEPHrine (EPIPEN JR) 0.15 mg/0.3 mL injection   Yes Yes   Si.15 mg by IntraMUSCular route once as needed. Facility-Administered Medications: None         Comments/Recommendations: Patient provided medication and allergy history. States he takes Benadryl very infrequently, always has EpiPen with him in case of emergencies. Deleted:  1. Cholecalciferol  2. Omeprazole    Thank you for allowing me to participate in the care of your patient.     Raphael Eubanks PharmD, RN #[id number]

## 2018-09-24 NOTE — IP AVS SNAPSHOT
[address] [id number] Patient: Daily Blackburn MRN: [id number] ARW:[birth year] A check catarino indicates which time of day the medication should be taken. My Medications START taking these medications Instructions Each Dose to Equal  
 Morning Noon Evening Bedtime HYDROcodone-acetaminophen 5-325 mg per tablet Commonly known as:  1463 [employer] Your next dose is:  9/27/18   5pm  
   
 Take 1-2 Tabs by mouth every four (4) hours as needed. Max Daily Amount: 12 Tabs. 1-2 Tab  
    
   
   
   
  
 ondansetron 4 mg disintegrating tablet Commonly known as:  ZOFRAN ODT Take 1 Tab by mouth every eight (8) hours as needed for Nausea. Indications: PREVENTION OF POST-OPERATIVE NAUSEA AND VOMITING  
 4 mg CONTINUE taking these medications Instructions Each Dose to Equal  
 Morning Noon Evening Bedtime EPINEPHrine 0.15 mg/0.3 mL injection Commonly known as:  EPIPEN JR  
   
 0.15 mg by IntraMUSCular route once as needed. 0.15 mg Where to Get Your Medications Information on where to get these meds will be given to you by the nurse or doctor. ! Ask your nurse or doctor about these medications HYDROcodone-acetaminophen 5-325 mg per tablet  
 ondansetron 4 mg disintegrating tablet

## 2018-09-25 ENCOUNTER — ANESTHESIA EVENT (OUTPATIENT)
Dept: SURGERY | Age: 23
DRG: 166 | End: 2018-09-25
Payer: OTHER GOVERNMENT

## 2018-09-25 ENCOUNTER — APPOINTMENT (OUTPATIENT)
Dept: CT IMAGING | Age: 23
DRG: 166 | End: 2018-09-25
Attending: THORACIC SURGERY (CARDIOTHORACIC VASCULAR SURGERY)
Payer: OTHER GOVERNMENT

## 2018-09-25 ENCOUNTER — APPOINTMENT (OUTPATIENT)
Dept: GENERAL RADIOLOGY | Age: 23
DRG: 166 | End: 2018-09-25
Attending: THORACIC SURGERY (CARDIOTHORACIC VASCULAR SURGERY)
Payer: OTHER GOVERNMENT

## 2018-09-25 ENCOUNTER — ANESTHESIA (OUTPATIENT)
Dept: SURGERY | Age: 23
DRG: 166 | End: 2018-09-25
Payer: OTHER GOVERNMENT

## 2018-09-25 PROCEDURE — 74011250637 HC RX REV CODE- 250/637: Performed by: THORACIC SURGERY (CARDIOTHORACIC VASCULAR SURGERY)

## 2018-09-25 PROCEDURE — 74011000258 HC RX REV CODE- 258: Performed by: THORACIC SURGERY (CARDIOTHORACIC VASCULAR SURGERY)

## 2018-09-25 PROCEDURE — 74011250636 HC RX REV CODE- 250/636: Performed by: ANESTHESIOLOGY

## 2018-09-25 PROCEDURE — 76010000162 HC OR TIME 1.5 TO 2 HR INTENSV-TIER 1: Performed by: THORACIC SURGERY (CARDIOTHORACIC VASCULAR SURGERY)

## 2018-09-25 PROCEDURE — 77030018673: Performed by: THORACIC SURGERY (CARDIOTHORACIC VASCULAR SURGERY)

## 2018-09-25 PROCEDURE — 74011250636 HC RX REV CODE- 250/636: Performed by: THORACIC SURGERY (CARDIOTHORACIC VASCULAR SURGERY)

## 2018-09-25 PROCEDURE — 77030018836 HC SOL IRR NACL ICUM -A: Performed by: THORACIC SURGERY (CARDIOTHORACIC VASCULAR SURGERY)

## 2018-09-25 PROCEDURE — 76210000016 HC OR PH I REC 1 TO 1.5 HR: Performed by: THORACIC SURGERY (CARDIOTHORACIC VASCULAR SURGERY)

## 2018-09-25 PROCEDURE — 77030008671 HC TU ENDO/BRNC CUF COVD -B: Performed by: ANESTHESIOLOGY

## 2018-09-25 PROCEDURE — 77030013079 HC BLNKT BAIR HGGR 3M -A: Performed by: ANESTHESIOLOGY

## 2018-09-25 PROCEDURE — 77030031139 HC SUT VCRL2 J&J -A: Performed by: THORACIC SURGERY (CARDIOTHORACIC VASCULAR SURGERY)

## 2018-09-25 PROCEDURE — 77030039266 HC ADH SKN EXOFIN S2SG -A: Performed by: THORACIC SURGERY (CARDIOTHORACIC VASCULAR SURGERY)

## 2018-09-25 PROCEDURE — 0W9B30Z DRAINAGE OF LEFT PLEURAL CAVITY WITH DRAINAGE DEVICE, PERCUTANEOUS APPROACH: ICD-10-PCS | Performed by: RADIOLOGY

## 2018-09-25 PROCEDURE — 76060000034 HC ANESTHESIA 1.5 TO 2 HR: Performed by: THORACIC SURGERY (CARDIOTHORACIC VASCULAR SURGERY)

## 2018-09-25 PROCEDURE — 0WJ84ZZ INSPECTION OF CHEST WALL, PERCUTANEOUS ENDOSCOPIC APPROACH: ICD-10-PCS | Performed by: THORACIC SURGERY (CARDIOTHORACIC VASCULAR SURGERY)

## 2018-09-25 PROCEDURE — 77030011640 HC PAD GRND REM COVD -A: Performed by: THORACIC SURGERY (CARDIOTHORACIC VASCULAR SURGERY)

## 2018-09-25 PROCEDURE — 74011000250 HC RX REV CODE- 250: Performed by: NURSE ANESTHETIST, CERTIFIED REGISTERED

## 2018-09-25 PROCEDURE — 74011250636 HC RX REV CODE- 250/636

## 2018-09-25 PROCEDURE — 71275 CT ANGIOGRAPHY CHEST: CPT

## 2018-09-25 PROCEDURE — 74011636320 HC RX REV CODE- 636/320: Performed by: THORACIC SURGERY (CARDIOTHORACIC VASCULAR SURGERY)

## 2018-09-25 PROCEDURE — 77030002996 HC SUT SLK J&J -A: Performed by: THORACIC SURGERY (CARDIOTHORACIC VASCULAR SURGERY)

## 2018-09-25 PROCEDURE — 77030018846 HC SOL IRR STRL H20 ICUM -A: Performed by: THORACIC SURGERY (CARDIOTHORACIC VASCULAR SURGERY)

## 2018-09-25 PROCEDURE — 65660000000 HC RM CCU STEPDOWN

## 2018-09-25 PROCEDURE — 74011000250 HC RX REV CODE- 250: Performed by: THORACIC SURGERY (CARDIOTHORACIC VASCULAR SURGERY)

## 2018-09-25 PROCEDURE — 74011000250 HC RX REV CODE- 250

## 2018-09-25 PROCEDURE — 77030003666 HC NDL SPINAL BD -A: Performed by: THORACIC SURGERY (CARDIOTHORACIC VASCULAR SURGERY)

## 2018-09-25 PROCEDURE — 77030016151 HC PROTCTR LNS DFOG COVD -B: Performed by: THORACIC SURGERY (CARDIOTHORACIC VASCULAR SURGERY)

## 2018-09-25 PROCEDURE — 71045 X-RAY EXAM CHEST 1 VIEW: CPT

## 2018-09-25 PROCEDURE — 77030032490 HC SLV COMPR SCD KNE COVD -B: Performed by: THORACIC SURGERY (CARDIOTHORACIC VASCULAR SURGERY)

## 2018-09-25 PROCEDURE — 77030012390 HC DRN CHST BTL GTNG -B: Performed by: THORACIC SURGERY (CARDIOTHORACIC VASCULAR SURGERY)

## 2018-09-25 RX ORDER — KETOROLAC TROMETHAMINE 30 MG/ML
30 INJECTION, SOLUTION INTRAMUSCULAR; INTRAVENOUS EVERY 6 HOURS
Status: COMPLETED | OUTPATIENT
Start: 2018-09-25 | End: 2018-09-26

## 2018-09-25 RX ORDER — ROPIVACAINE HYDROCHLORIDE 5 MG/ML
150 INJECTION, SOLUTION EPIDURAL; INFILTRATION; PERINEURAL AS NEEDED
Status: DISCONTINUED | OUTPATIENT
Start: 2018-09-25 | End: 2018-09-25 | Stop reason: HOSPADM

## 2018-09-25 RX ORDER — SODIUM CHLORIDE 0.9 % (FLUSH) 0.9 %
5-10 SYRINGE (ML) INJECTION EVERY 8 HOURS
Status: DISCONTINUED | OUTPATIENT
Start: 2018-09-25 | End: 2018-09-25 | Stop reason: HOSPADM

## 2018-09-25 RX ORDER — SODIUM CHLORIDE, SODIUM LACTATE, POTASSIUM CHLORIDE, CALCIUM CHLORIDE 600; 310; 30; 20 MG/100ML; MG/100ML; MG/100ML; MG/100ML
100 INJECTION, SOLUTION INTRAVENOUS CONTINUOUS
Status: DISCONTINUED | OUTPATIENT
Start: 2018-09-25 | End: 2018-09-25

## 2018-09-25 RX ORDER — ACETAMINOPHEN 10 MG/ML
INJECTION, SOLUTION INTRAVENOUS AS NEEDED
Status: DISCONTINUED | OUTPATIENT
Start: 2018-09-25 | End: 2018-09-25 | Stop reason: HOSPADM

## 2018-09-25 RX ORDER — SENNOSIDES 8.6 MG/1
1 TABLET ORAL
Status: DISCONTINUED | OUTPATIENT
Start: 2018-09-25 | End: 2018-09-27 | Stop reason: HOSPADM

## 2018-09-25 RX ORDER — MIDAZOLAM HYDROCHLORIDE 1 MG/ML
0.5 INJECTION, SOLUTION INTRAMUSCULAR; INTRAVENOUS
Status: DISCONTINUED | OUTPATIENT
Start: 2018-09-25 | End: 2018-09-25

## 2018-09-25 RX ORDER — SODIUM CHLORIDE 0.9 % (FLUSH) 0.9 %
5-10 SYRINGE (ML) INJECTION EVERY 8 HOURS
Status: DISCONTINUED | OUTPATIENT
Start: 2018-09-25 | End: 2018-09-27 | Stop reason: HOSPADM

## 2018-09-25 RX ORDER — MIDAZOLAM HYDROCHLORIDE 1 MG/ML
1 INJECTION, SOLUTION INTRAMUSCULAR; INTRAVENOUS AS NEEDED
Status: DISCONTINUED | OUTPATIENT
Start: 2018-09-25 | End: 2018-09-25 | Stop reason: HOSPADM

## 2018-09-25 RX ORDER — MORPHINE SULFATE 1 MG/ML
2 INJECTION, SOLUTION EPIDURAL; INTRATHECAL; INTRAVENOUS
Status: DISCONTINUED | OUTPATIENT
Start: 2018-09-25 | End: 2018-09-25

## 2018-09-25 RX ORDER — ETOMIDATE 2 MG/ML
INJECTION INTRAVENOUS AS NEEDED
Status: DISCONTINUED | OUTPATIENT
Start: 2018-09-25 | End: 2018-09-25 | Stop reason: HOSPADM

## 2018-09-25 RX ORDER — DEXMEDETOMIDINE HYDROCHLORIDE 4 UG/ML
INJECTION, SOLUTION INTRAVENOUS AS NEEDED
Status: DISCONTINUED | OUTPATIENT
Start: 2018-09-25 | End: 2018-09-25 | Stop reason: HOSPADM

## 2018-09-25 RX ORDER — SODIUM CHLORIDE 0.9 % (FLUSH) 0.9 %
5-10 SYRINGE (ML) INJECTION AS NEEDED
Status: DISCONTINUED | OUTPATIENT
Start: 2018-09-25 | End: 2018-09-25 | Stop reason: HOSPADM

## 2018-09-25 RX ORDER — SODIUM CHLORIDE, SODIUM LACTATE, POTASSIUM CHLORIDE, CALCIUM CHLORIDE 600; 310; 30; 20 MG/100ML; MG/100ML; MG/100ML; MG/100ML
1000 INJECTION, SOLUTION INTRAVENOUS CONTINUOUS
Status: DISCONTINUED | OUTPATIENT
Start: 2018-09-25 | End: 2018-09-25 | Stop reason: HOSPADM

## 2018-09-25 RX ORDER — OXYCODONE HYDROCHLORIDE 5 MG/1
5 TABLET ORAL AS NEEDED
Status: DISCONTINUED | OUTPATIENT
Start: 2018-09-25 | End: 2018-09-25

## 2018-09-25 RX ORDER — LIDOCAINE HYDROCHLORIDE 20 MG/ML
INJECTION, SOLUTION EPIDURAL; INFILTRATION; INTRACAUDAL; PERINEURAL AS NEEDED
Status: DISCONTINUED | OUTPATIENT
Start: 2018-09-25 | End: 2018-09-25 | Stop reason: HOSPADM

## 2018-09-25 RX ORDER — ONDANSETRON 2 MG/ML
INJECTION INTRAMUSCULAR; INTRAVENOUS AS NEEDED
Status: DISCONTINUED | OUTPATIENT
Start: 2018-09-25 | End: 2018-09-25 | Stop reason: HOSPADM

## 2018-09-25 RX ORDER — ROCURONIUM BROMIDE 10 MG/ML
INJECTION, SOLUTION INTRAVENOUS AS NEEDED
Status: DISCONTINUED | OUTPATIENT
Start: 2018-09-25 | End: 2018-09-25 | Stop reason: HOSPADM

## 2018-09-25 RX ORDER — SODIUM CHLORIDE 9 MG/ML
25 INJECTION, SOLUTION INTRAVENOUS CONTINUOUS
Status: DISCONTINUED | OUTPATIENT
Start: 2018-09-25 | End: 2018-09-25 | Stop reason: HOSPADM

## 2018-09-25 RX ORDER — MIDAZOLAM HYDROCHLORIDE 1 MG/ML
INJECTION, SOLUTION INTRAMUSCULAR; INTRAVENOUS AS NEEDED
Status: DISCONTINUED | OUTPATIENT
Start: 2018-09-25 | End: 2018-09-25 | Stop reason: HOSPADM

## 2018-09-25 RX ORDER — SODIUM CHLORIDE 0.9 % (FLUSH) 0.9 %
5-10 SYRINGE (ML) INJECTION AS NEEDED
Status: DISCONTINUED | OUTPATIENT
Start: 2018-09-25 | End: 2018-09-27 | Stop reason: HOSPADM

## 2018-09-25 RX ORDER — SODIUM CHLORIDE 0.9 % (FLUSH) 0.9 %
5-10 SYRINGE (ML) INJECTION AS NEEDED
Status: DISCONTINUED | OUTPATIENT
Start: 2018-09-25 | End: 2018-09-25

## 2018-09-25 RX ORDER — LIDOCAINE HYDROCHLORIDE 10 MG/ML
0.1 INJECTION, SOLUTION EPIDURAL; INFILTRATION; INTRACAUDAL; PERINEURAL AS NEEDED
Status: DISCONTINUED | OUTPATIENT
Start: 2018-09-25 | End: 2018-09-25 | Stop reason: HOSPADM

## 2018-09-25 RX ORDER — DEXAMETHASONE SODIUM PHOSPHATE 4 MG/ML
INJECTION, SOLUTION INTRA-ARTICULAR; INTRALESIONAL; INTRAMUSCULAR; INTRAVENOUS; SOFT TISSUE AS NEEDED
Status: DISCONTINUED | OUTPATIENT
Start: 2018-09-25 | End: 2018-09-25 | Stop reason: HOSPADM

## 2018-09-25 RX ORDER — SODIUM CHLORIDE 0.9 % (FLUSH) 0.9 %
10 SYRINGE (ML) INJECTION
Status: COMPLETED | OUTPATIENT
Start: 2018-09-25 | End: 2018-09-25

## 2018-09-25 RX ORDER — SODIUM CHLORIDE 9 MG/ML
25 INJECTION, SOLUTION INTRAVENOUS CONTINUOUS
Status: DISCONTINUED | OUTPATIENT
Start: 2018-09-25 | End: 2018-09-25

## 2018-09-25 RX ORDER — FENTANYL CITRATE 50 UG/ML
INJECTION, SOLUTION INTRAMUSCULAR; INTRAVENOUS AS NEEDED
Status: DISCONTINUED | OUTPATIENT
Start: 2018-09-25 | End: 2018-09-25 | Stop reason: HOSPADM

## 2018-09-25 RX ORDER — SUCCINYLCHOLINE CHLORIDE 20 MG/ML
INJECTION INTRAMUSCULAR; INTRAVENOUS AS NEEDED
Status: DISCONTINUED | OUTPATIENT
Start: 2018-09-25 | End: 2018-09-25 | Stop reason: HOSPADM

## 2018-09-25 RX ORDER — FENTANYL CITRATE 50 UG/ML
50 INJECTION, SOLUTION INTRAMUSCULAR; INTRAVENOUS AS NEEDED
Status: DISCONTINUED | OUTPATIENT
Start: 2018-09-25 | End: 2018-09-25 | Stop reason: HOSPADM

## 2018-09-25 RX ORDER — CEFAZOLIN SODIUM/WATER 2 G/20 ML
2 SYRINGE (ML) INTRAVENOUS
Status: COMPLETED | OUTPATIENT
Start: 2018-09-25 | End: 2018-09-25

## 2018-09-25 RX ORDER — PHENYLEPHRINE HCL IN 0.9% NACL 0.4MG/10ML
SYRINGE (ML) INTRAVENOUS AS NEEDED
Status: DISCONTINUED | OUTPATIENT
Start: 2018-09-25 | End: 2018-09-25 | Stop reason: HOSPADM

## 2018-09-25 RX ORDER — ONDANSETRON 2 MG/ML
4 INJECTION INTRAMUSCULAR; INTRAVENOUS AS NEEDED
Status: DISCONTINUED | OUTPATIENT
Start: 2018-09-25 | End: 2018-09-25

## 2018-09-25 RX ORDER — FENTANYL CITRATE 50 UG/ML
50 INJECTION, SOLUTION INTRAMUSCULAR; INTRAVENOUS
Status: DISCONTINUED | OUTPATIENT
Start: 2018-09-25 | End: 2018-09-27 | Stop reason: HOSPADM

## 2018-09-25 RX ORDER — DIPHENHYDRAMINE HYDROCHLORIDE 50 MG/ML
12.5 INJECTION, SOLUTION INTRAMUSCULAR; INTRAVENOUS AS NEEDED
Status: DISCONTINUED | OUTPATIENT
Start: 2018-09-25 | End: 2018-09-25

## 2018-09-25 RX ORDER — FENTANYL CITRATE 50 UG/ML
25 INJECTION, SOLUTION INTRAMUSCULAR; INTRAVENOUS
Status: DISCONTINUED | OUTPATIENT
Start: 2018-09-25 | End: 2018-09-25

## 2018-09-25 RX ADMIN — IOPAMIDOL 100 ML: 755 INJECTION, SOLUTION INTRAVENOUS at 16:18

## 2018-09-25 RX ADMIN — ROCURONIUM BROMIDE 10 MG: 10 INJECTION, SOLUTION INTRAVENOUS at 11:21

## 2018-09-25 RX ADMIN — LIDOCAINE HYDROCHLORIDE 60 MG: 20 INJECTION, SOLUTION EPIDURAL; INFILTRATION; INTRACAUDAL; PERINEURAL at 11:21

## 2018-09-25 RX ADMIN — Medication 80 MCG: at 12:56

## 2018-09-25 RX ADMIN — SODIUM CHLORIDE, SODIUM LACTATE, POTASSIUM CHLORIDE, AND CALCIUM CHLORIDE: 600; 310; 30; 20 INJECTION, SOLUTION INTRAVENOUS at 11:10

## 2018-09-25 RX ADMIN — Medication 80 MCG: at 11:30

## 2018-09-25 RX ADMIN — Medication 10 ML: at 16:18

## 2018-09-25 RX ADMIN — KETOROLAC TROMETHAMINE 30 MG: 30 INJECTION, SOLUTION INTRAMUSCULAR at 13:39

## 2018-09-25 RX ADMIN — SENNOSIDES 8.6 MG: 8.6 TABLET, FILM COATED ORAL at 21:01

## 2018-09-25 RX ADMIN — ROCURONIUM BROMIDE 20 MG: 10 INJECTION, SOLUTION INTRAVENOUS at 11:40

## 2018-09-25 RX ADMIN — ETOMIDATE 10 MG: 2 INJECTION INTRAVENOUS at 11:25

## 2018-09-25 RX ADMIN — Medication 2 G: at 11:34

## 2018-09-25 RX ADMIN — MIDAZOLAM HYDROCHLORIDE 3 MG: 1 INJECTION, SOLUTION INTRAMUSCULAR; INTRAVENOUS at 11:11

## 2018-09-25 RX ADMIN — FENTANYL CITRATE 100 MCG: 50 INJECTION, SOLUTION INTRAMUSCULAR; INTRAVENOUS at 11:21

## 2018-09-25 RX ADMIN — Medication 10 ML: at 22:00

## 2018-09-25 RX ADMIN — FENTANYL CITRATE 50 MCG: 50 INJECTION, SOLUTION INTRAMUSCULAR; INTRAVENOUS at 12:06

## 2018-09-25 RX ADMIN — KETOROLAC TROMETHAMINE 30 MG: 30 INJECTION, SOLUTION INTRAMUSCULAR at 17:23

## 2018-09-25 RX ADMIN — OXYCODONE HYDROCHLORIDE AND ACETAMINOPHEN 2 TABLET: 5; 325 TABLET ORAL at 21:40

## 2018-09-25 RX ADMIN — DEXMEDETOMIDINE HYDROCHLORIDE 12 MCG: 4 INJECTION, SOLUTION INTRAVENOUS at 12:40

## 2018-09-25 RX ADMIN — Medication 80 MCG: at 11:34

## 2018-09-25 RX ADMIN — Medication 80 MCG: at 11:37

## 2018-09-25 RX ADMIN — ONDANSETRON 4 MG: 2 INJECTION INTRAMUSCULAR; INTRAVENOUS at 11:44

## 2018-09-25 RX ADMIN — ACETAMINOPHEN 1000 MG: 10 INJECTION, SOLUTION INTRAVENOUS at 11:45

## 2018-09-25 RX ADMIN — SODIUM CHLORIDE 100 ML: 900 INJECTION, SOLUTION INTRAVENOUS at 16:18

## 2018-09-25 RX ADMIN — FENTANYL CITRATE 50 MCG: 50 INJECTION, SOLUTION INTRAMUSCULAR; INTRAVENOUS at 11:49

## 2018-09-25 RX ADMIN — OXYCODONE HYDROCHLORIDE AND ACETAMINOPHEN 2 TABLET: 5; 325 TABLET ORAL at 08:11

## 2018-09-25 RX ADMIN — DEXAMETHASONE SODIUM PHOSPHATE 4 MG: 4 INJECTION, SOLUTION INTRA-ARTICULAR; INTRALESIONAL; INTRAMUSCULAR; INTRAVENOUS; SOFT TISSUE at 11:44

## 2018-09-25 RX ADMIN — MIDAZOLAM HYDROCHLORIDE 2 MG: 1 INJECTION, SOLUTION INTRAMUSCULAR; INTRAVENOUS at 11:17

## 2018-09-25 RX ADMIN — SUGAMMADEX 240 MG: 100 INJECTION, SOLUTION INTRAVENOUS at 12:28

## 2018-09-25 RX ADMIN — ETOMIDATE 20 MG: 2 INJECTION INTRAVENOUS at 11:21

## 2018-09-25 RX ADMIN — Medication 80 MCG: at 12:51

## 2018-09-25 RX ADMIN — SUCCINYLCHOLINE CHLORIDE 120 MG: 20 INJECTION INTRAMUSCULAR; INTRAVENOUS at 11:21

## 2018-09-25 NOTE — PROGRESS NOTES
Problem: Falls - Risk of  Goal: *Absence of Falls  Document Sheri Fall Risk and appropriate interventions in the flowsheet.    Outcome: Progressing Towards Goal  Fall Risk Interventions:            Medication Interventions: Teach patient to arise slowly                  Comments: Pt is off floor to the OR

## 2018-09-25 NOTE — PROGRESS NOTES
Faculty or Preceptor Review of Student Work    9/25/2018  - Shift times - [phone]    The student documentation of patient care for Mer Patel has been reviewed and approved. All medications have been administered under the direct supervision of the faculty or preceptor.     Crystal Fonseca RN

## 2018-09-25 NOTE — ROUTINE PROCESS
Skin assessment in OR  unchanged from Holding area assessment. Patient: Kurt Montiel MRN: [id number]  SSN: [id number]   YOB: 1995  Age: 26 y.o. Sex: male     Patient is status post Procedure(s):  EXPLORATORY THOROCOSCOPY.     Surgeon(s) and Role:     * Shruti Estevez MD - Primary                    Peripheral IV 09/24/18 Right Antecubital (Active)   Site Assessment Clean, dry, & intact 9/25/2018  8:10 AM   Phlebitis Assessment 0 9/25/2018  8:10 AM   Infiltration Assessment 0 9/25/2018  8:10 AM   Dressing Status Clean, dry, & intact 9/25/2018  8:10 AM   Dressing Type Tape;Transparent 9/25/2018  8:10 AM   Hub Color/Line Status Green;Capped 9/25/2018  8:10 AM   Action Taken Open ports on tubing capped 9/25/2018  8:10 AM   Alcohol Cap Used Yes 9/25/2018  8:10 AM       Peripheral IV 09/24/18 Left Antecubital (Active)   Site Assessment Clean, dry, & intact 9/25/2018  8:10 AM   Phlebitis Assessment 0 9/25/2018  8:10 AM   Infiltration Assessment 0 9/25/2018  8:10 AM   Dressing Status Clean, dry, & intact 9/25/2018  8:10 AM   Dressing Type Transparent 9/25/2018  8:10 AM   Hub Color/Line Status Pink;Flushed 9/25/2018  8:10 AM   Action Taken Open ports on tubing capped 9/25/2018  8:10 AM   Alcohol Cap Used Yes 9/25/2018  8:10 AM            Airway - Endotracheal Tube 09/25/18 (Active)       Airway - Endotracheal Tube 09/25/18 (Active)                   Dressing/Packing:     Splint/Cast:  ]    Other:

## 2018-09-25 NOTE — OP NOTES
[address] [place] [address] REPORT    Rita Weeks  MR#: [id number]  : 1995  ACCOUNT #: [de-identified]   DATE OF SERVICE: 2018    CLINICAL SERVICE:  Thoracic Surgery     SURGEON:  Hi Alfaro MD    PROCEDURE PERFORMED:  Exploratory left video-assisted thoracoscopy. PREOPERATIVE DIAGNOSIS:  Left spontaneous pneumothorax. POSTOPERATIVE DIAGNOSES:  1. Left spontaneous pneumothorax. 2.  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 ASSISTANT:  Tavia Peña    SPECIMENS REMOVED:  None. DRAINS AND TUBES:  One 28-Slovenian chest tube was left within the left hemithorax. ANESTHESIA:  General with double-lumen endotracheal intubation. ESTIMATED BLOOD LOSS:  For this case was less than 10 mL. INDICATIONS FOR PROCEDURE:  Patient is a 44-year-old gentleman who presented to the emergency department with a 1-1/2 week history of progressive sharp left-sided chest pain. He became acutely dyspneic yesterday and was diagnosed with a left spontaneous pneumothorax. A pigtail was placed in the ER and a noncontrast chest CT had the suggestion of an apical bleb. In addition, he is very tall and lanky and has had a questionable diagnosis of Marfan's in the past.  Given his high likelihood for recurrence, the decision was made to proceed to the operating room for VATS, a wedge and a pleurodesis. PROCEDURE IN DETAIL:  After informed consent was obtained and placed on the chart, the patient was taken to the operating room and placed supine on the table. General anesthesia with double lumen endotracheal intubation was induced without complication. Preop antibiotics were administered. The patient was then placed in the right lateral decubitus position with left side up. The patient's left pigtail was removed. The patient's left chest was prepped and draped in a sterile fashion. Timeout was performed.     Through the eighth intercostal space along the posterior axillary line, a 10 mm Thoracoport was placed. The camera was introduced within the hemithorax. It was obvious that the surface of the left upper lobe was hypervascular. There were multiple fairly large pulsatile vessels snaking across different portions of the left upper lobe. There seemed to be a very large collection of these vessels medially, which appeared to be attached to the mammary vessel system. In addition, more posteriorly there also seemed to be a collection of these vessels attached to the proximal portion of the descending aorta. There was 1 tiny bleb which was right next to one of these vessels in question. Multiple intra-op pictures were taken and printed off. After careful consideration and inspection of the entire thoracic cavity, the decision was made not to perform a pleurodesis just in case additional procedures were to be needed. In addition, the decision was made not to perform a wedge resection due to the hypervascular nature of the left upper lobe and due to the fact that we were not sure of the vascular system. Approximately 30 mL of 1% lidocaine mixed with 0.25% Marcaine was used as local anesthetic to perform intercostal nerve blocks. A 28-Armenian chest tube was placed posteriorly. The lung was reinflated under direct visualization. The single incision was then closed in a multilayer fashion and covered with Dermabond. All surgical counts were correct x2 at the end of the case. COMPLICATIONS:  There were no immediate complications identified during this case. Dr. Hollingsworth was present and scrubbed throughout the entire procedure. MD KIAH Muhammad /   D: 09/25/2018 13:55     T: 09/25/2018 15:34  JOB #: [id number]

## 2018-09-25 NOTE — CONSULTS
Asked to see Mr. Angela Guerrero re: spontaneous pneumothorax    Called by the ER re: symptomatic left spontaneous pneumothorax. Mr. Angela Guerrero is a pleasant 25 y/o gentleman with a past medical history significant for pectus carinatum. He presented to an urgent care and then to the ER with a 1+ week history of progressive left sided chest pain and dyspnea. He was diagnosed with a left pneumothorax. After consultation by phone a pigtail was placed by the ER with eventual re-expansion. He also had a Chest CT. Allergies:  Food allergies     PMHx: pectus carinatum, ? Marfan's, PUD    PSHx: ex-lap with chuck patch    SocHx: non smoker    FamHx: father cardiovascular disease    Meds: none    ROS:    Constitutional- denies weight loss  HEENT- denies dysphagia  Neuro- denies headaches  Optho- no changes in vision  Resp- dyspnea  CV- atypical chest pain  GI- denies abd pain  - no complaints  ID- denies recent fevers  Vasc- denies claudication    Afebrile  P 110  BP 90/50    CT minimal output, tiny air leak with forceful cough    On exam he is laying in bed  Alert and oriented  Pale  Tall, lanky, thin  Normal speech, normal affect  Not tachypneic  No goiter  No crepitus  Lungs CTA b/l  Dressing c/d/i  RRR, no murmurs  Prominent sternum  Radial pulses palp b/l  Abd SNTND, no organomegaly, midline incision well healed  LE non edematous    ========================    WBC 6.2  Hgb 16.2  Plts 223    Cr 0.96    ==========================    I have personally reviewed his CXRs. He presented with a moderate left pneumothorax. Pigtail well placed. He then had an expansion of his pneumo before eventual resolution.    ============================    I have personally reviewed his Chest CT. There is the suggestion of a CRISTÓBAL apical bleb    ============================    Diagnoses  1: Spontaneous left pneumothorax    ============================    Mr. Angela Guerrero had a symptomatic left spontaneous pneumothorax. Resolved with a pigtail. This is his first episode which we would usually manage more conservatively. However, given his body habitus and the strong suggestion of a bleb on his chest CT I think he is at a very high risk of recurrence and would recommend a left VATS, apical wedge and pleurodesis. Posted for later today. Minimal comorbidities. Pre- op orders entered. Thank you for allowing us to care for Mr. Delores Saul.

## 2018-09-25 NOTE — CDMP QUERY
Soco Arreola, Patient is noted to have a BMI of BMI: 16.87 kg/m 2. Please clarify if this patient is:  
 
=>Underweight 
=>Cachexia 
=>Malnourished 
=>Other explanation of clinical findings =>Unable to determine (no explanation for clinical findings) Presentation: 6'2\", 129 lbs = BMI BMI: 16.87 kg/m 2 Please clarify and document your clinical opinion in the progress notes and discharge summary, including the definitive and or presumptive diagnosis, (suspected or probable), related to the above clinical findings. Please include clinical findings supporting your diagnosis. Thank you, Emani Gill [facility] [address] 
[phone]

## 2018-09-25 NOTE — BRIEF OP NOTE
BRIEF OPERATIVE NOTE    Date of Procedure: 9/25/2018   Preoperative Diagnosis: spontaneous pneumothorax  Postoperative Diagnosis: spontaneous pneumothorax    Procedure(s):  EXPLORATORY THOROCOSCOPY  Surgeon(s) and Role:     * Uzma Drake MD - Primary         Surgical Assistant: none    Surgical Staff:  Circ-1: Skye Balderas RN  Circ-Relief: Deanna Mckinney RN  Scrub RN-1: Elvi Brar RN  Scrub RN-Relief: Ashley Mackay RN  Surg Asst-1: Hudson Richey  Surg Asst-2: Linus Farrar  Surg Asst-Relief: Makenzie Perry  Event Time In   Incision Start 1149   Incision Close      Anesthesia: General   Estimated Blood Loss: <10ml  Specimens: * No specimens in log *   Findings: suspected large AVM arising from proximal descending Aorta and involving the CRISTÓBAL.   Did not perform wedge or pleurodesis based on findings and uncertainty of vascular anatomy and or need for further interventions   Complications: none  Implants: * No implants in log *     Condition: stable    Disposition: to pacu

## 2018-09-25 NOTE — H&P
Asked to see Mr. Brenda Cruz re: spontaneous pneumothorax    Called by the ER re: symptomatic left spontaneous pneumothorax. Mr. Brenda Cruz is a pleasant 27 y/o gentleman with a past medical history significant for pectus carinatum. He presented to an urgent care and then to the ER with a 1+ week history of progressive left sided chest pain and dyspnea. He was diagnosed with a left pneumothorax. After consultation by phone a pigtail was placed by the ER with eventual re-expansion. He also had a Chest CT. Allergies:  Food allergies     PMHx: pectus carinatum, ? Marfan's, PUD    PSHx: ex-lap with chuck patch    SocHx: non smoker    FamHx: father cardiovascular disease    Meds: none    ROS:    Constitutional- denies weight loss  HEENT- denies dysphagia  Neuro- denies headaches  Optho- no changes in vision  Resp- dyspnea  CV- atypical chest pain  GI- denies abd pain  - no complaints  ID- denies recent fevers  Vasc- denies claudication    Afebrile  P 110  BP 90/50    CT minimal output, tiny air leak with forceful cough    On exam he is laying in bed  Alert and oriented  Pale  Tall, lanky, thin  Normal speech, normal affect  Not tachypneic  No goiter  No crepitus  Lungs CTA b/l  Dressing c/d/i  RRR, no murmurs  Prominent sternum  Radial pulses palp b/l  Abd SNTND, no organomegaly, midline incision well healed  LE non edematous    ========================    WBC 6.2  Hgb 16.2  Plts 223    Cr 0.96    ==========================    I have personally reviewed his CXRs. He presented with a moderate left pneumothorax. Pigtail well placed. He then had an expansion of his pneumo before eventual resolution.    ============================    I have personally reviewed his Chest CT. There is the suggestion of a CRISTÓBAL apical bleb    ============================    Diagnoses  1: Spontaneous left pneumothorax    ============================    Mr. Brenda Cruz had a symptomatic left spontaneous pneumothorax. Resolved with a pigtail. This is his first episode which we would usually manage more conservatively. However, given his body habitus and the strong suggestion of a bleb on his chest CT I think he is at a very high risk of recurrence and would recommend a left VATS, apical wedge and pleurodesis. Posted for later today. Minimal comorbidities. Pre- op orders entered. Thank you for allowing us to care for Mr. Barb Diallo.

## 2018-09-25 NOTE — ROUTINE PROCESS
TRANSFER - OUT REPORT:    Verbal report given to Maddie Goldman RN(name) on [employer]  being transferred to [facility](unit) for routine progression of care       Report consisted of patients Situation, Background, Assessment and   Recommendations(SBAR). Information from the following report(s) SBAR, ED Summary, [facility] and Recent Results was reviewed with the receiving nurse. Lines:   Peripheral IV 09/24/18 Right Antecubital (Active)   Site Assessment Clean, dry, & intact 9/24/2018  4:43 PM   Phlebitis Assessment 0 9/24/2018  4:43 PM   Infiltration Assessment 0 9/24/2018  4:43 PM   Dressing Status Clean, dry, & intact 9/24/2018  4:43 PM   Dressing Type Transparent 9/24/2018  4:43 PM   Hub Color/Line Status Green;Flushed;Patent 9/24/2018  4:43 PM       Peripheral IV 09/24/18 Left Antecubital (Active)   Site Assessment Clean, dry, & intact 9/24/2018  4:45 PM   Phlebitis Assessment 0 9/24/2018  4:45 PM   Infiltration Assessment 0 9/24/2018  4:45 PM   Dressing Status Clean, dry, & intact 9/24/2018  4:45 PM   Dressing Type Transparent 9/24/2018  4:45 PM   Hub Color/Line Status Pink;Flushed;Patent 9/24/2018  4:45 PM   Action Taken Blood drawn 9/24/2018  4:45 PM        Opportunity for questions and clarification was provided.       Patient transported with:   Monitor  [employer]

## 2018-09-25 NOTE — PROGRESS NOTES
Clinical Care Lead Arrival Summary        Name: Anabela Xavier  MRN: [id number]  Date: 2018 12:36 PM  Admission Date: 2018  : 1995  Situation:  18 Exploratory Thorascopy by Dr. 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.08XA]     Pectus carinatum [Q67.7]               The Beta blocker the patient is taking is [unfilled], none          STAND: postop if indicated  Voice quality/secretions:    Hx of dysphagia:    O2 sat:    Alertness:      Flu vaccination received for current season:       Pneumonia vaccination received in the past:  Anticoagulant Therapy: No    VTE Documentation  VTE Risk Assessment    Mechanical VTE orders: Mechanical VTE Orders: No  Venous Foot Pump:    Graduated Compression Stockings:    Sequential Compression Devices:    Patient Refused VTE:        Diet: DIET NPO              Assessment:    Lines/Drains/Airways:   Peripheral IV 18 Right Antecubital (Active)   Site Assessment Clean, dry, & intact 2018  8:10 AM   Phlebitis Assessment 0 2018  8:10 AM   Infiltration Assessment 0 2018  8:10 AM   Dressing Status Clean, dry, & intact 2018  8:10 AM   Dressing Type Tape;Transparent 2018  8:10 AM   Hub Color/Line Status Green;Capped 2018  8:10 AM   Action Taken Open ports on tubing capped 2018  8:10 AM   Alcohol Cap Used Yes 2018  8:10 AM       Peripheral IV 18 Left Antecubital (Active)   Site Assessment Clean, dry, & intact 2018  8:10 AM   Phlebitis Assessment 0 2018  8:10 AM   Infiltration Assessment 0 2018  8:10 AM   Dressing Status Clean, dry, & intact 2018  8:10 AM   Dressing Type Transparent 2018  8:10 AM   Hub Color/Line Status Pink;Flushed 2018  8:10 AM   Action Taken Open ports on tubing capped 2018  8:10 AM   Alcohol Cap Used Yes 2018  8:10 AM       Airway - Endotracheal Tube 18 (Active)       Airway - Endotracheal Tube 09/25/18 (Active)       Chest Tube #1 09/24/18 Left (Active)   Chest Tube Dressing Dry 9/25/2018  8:10 AM   Chest Tube Airleak No 9/25/2018  8:10 AM   Drainage Description Serosanguinous 9/25/2018  8:10 AM   Status Continuous Suction 9/25/2018  8:10 AM   Drainage Chamber Level (ml) 0 ml 9/25/2018  8:10 AM   Output (ml) 5 ml 9/25/2018  8:10 AM       Last 3 Weights:  Last 3 Recorded Weights in this Encounter    09/24/18 1622 09/25/18 0122   Weight: 59.4 kg (131 lb) 59.6 kg (131 lb 6.3 oz)     @FLOW(14)      Recommendations:  Consult Orders: )IP CONSULT TO THORACIC SURGERY  Recent orders:  INSERT PERIPHERAL IV  SALINE LOCK IV  SALINE LOCK IV  XR CHEST PORT  XR CHEST PORT  CT CHEST WO CONT  XR CHEST PORT  XR CHEST PORT  POC GLUCOSE  VITAL SIGNS PER UNIT ROUTINE  INTAKE AND OUTPUT  UP AD TE  CHEST TUBE CARE / SUCTION  POC URINE PREGNANCY TEST  POC GLUCOSE  NOTIFY ANESTHESIA  IP CONSULT TO THORACIC SURGERY  INITIAL PHYSICIAN ORDER: INPATIENT  DIET NPO  FULL CODE  TRANSFER PATIENT  SALINE LOCK IV  XR CHEST PORT  POC GLUCOSE  CHEST TUBE CARE / SUCTION  INCENTIVE SPIROMETRY  APPLY/MAINTAIN SEQUENTIAL COMPRESSION DEVICE  DIET CLEAR LIQUID    Core Measures Compliant   CHF:na   Pneumonia:na   Vaccines:screen if needed   SCIP:yes   Eric:na   AMI:na

## 2018-09-25 NOTE — PERIOP NOTES
TRANSFER - OUT REPORT:    Verbal report given to JESSICA RIOS(name) on [employer]  being transferred to [facility](unit) for routine post - op       Report consisted of patients Situation, Background, Assessment and   Recommendations(SBAR). Time Pre op antibiotic given:Y  Anesthesia Stop time: N  Eric Present on Transfer to floor:N  Order for Eric on Chart:N  Discharge Prescriptions with Chart:N    Information from the following report(s) SBAR was reviewed with the receiving nurse. Opportunity for questions and clarification was provided. Is the patient on 02? NO       L/Min        Other     Is the patient on a monitor? YES    Is the nurse transporting with the patient? YES    Surgical Waiting Area notified of patient's transfer from PACU?  YES      The following personal items collected during your admission accompanied patient upon transfer:   Dental Appliance: Dental Appliances: None  Vision: Visual Aid: Glasses  Hearing Aid:    Jewelry:    Clothing:    Other Valuables:    Valuables sent to safe:

## 2018-09-25 NOTE — PROGRESS NOTES
TRANSFER - IN REPORT:    Verbal report received from Carey(name) on Kareem Escobedo  being received from ED(unit) for routine progression of care      Report consisted of patients Situation, Background, Assessment and   Recommendations(SBAR). Information from the following report(s) SBAR, Kardex, ED Summary, Intake/Output, MAR, Recent Results and Cardiac Rhythm sinus tachy was reviewed with the receiving nurse. Opportunity for questions and clarification was provided. Assessment completed upon patients arrival to unit and care assumed. 2200 called Dr. Robert Foster to update on pt condition (hypotensive, elevated hr rate and nausea). MD ordered STAT chest x ray, Toradol , IV Tylenol if Toradol does not control pain with low BP, and Zofran. 0800 Bedside and Verbal shift change report given to Amy (oncoming nurse) by Margarita Hinds (offgoing nurse). Report included the following information SBAR, Kardex, ED Summary, Procedure Summary, Intake/Output, MAR and Cardiac Rhythm normal sinus/ sinus tach.

## 2018-09-26 ENCOUNTER — APPOINTMENT (OUTPATIENT)
Dept: GENERAL RADIOLOGY | Age: 23
DRG: 166 | End: 2018-09-26
Attending: NURSE PRACTITIONER
Payer: OTHER GOVERNMENT

## 2018-09-26 PROCEDURE — 74011250637 HC RX REV CODE- 250/637: Performed by: THORACIC SURGERY (CARDIOTHORACIC VASCULAR SURGERY)

## 2018-09-26 PROCEDURE — 74011250636 HC RX REV CODE- 250/636: Performed by: THORACIC SURGERY (CARDIOTHORACIC VASCULAR SURGERY)

## 2018-09-26 PROCEDURE — 93306 TTE W/DOPPLER COMPLETE: CPT

## 2018-09-26 PROCEDURE — 71045 X-RAY EXAM CHEST 1 VIEW: CPT

## 2018-09-26 PROCEDURE — 65660000000 HC RM CCU STEPDOWN

## 2018-09-26 RX ADMIN — OXYCODONE HYDROCHLORIDE AND ACETAMINOPHEN 2 TABLET: 5; 325 TABLET ORAL at 15:32

## 2018-09-26 RX ADMIN — ONDANSETRON 4 MG: 2 INJECTION INTRAMUSCULAR; INTRAVENOUS at 23:20

## 2018-09-26 RX ADMIN — OXYCODONE HYDROCHLORIDE AND ACETAMINOPHEN 2 TABLET: 5; 325 TABLET ORAL at 07:06

## 2018-09-26 RX ADMIN — Medication 10 ML: at 06:02

## 2018-09-26 RX ADMIN — KETOROLAC TROMETHAMINE 30 MG: 30 INJECTION, SOLUTION INTRAMUSCULAR at 00:31

## 2018-09-26 RX ADMIN — OXYCODONE HYDROCHLORIDE AND ACETAMINOPHEN 2 TABLET: 5; 325 TABLET ORAL at 21:54

## 2018-09-26 RX ADMIN — Medication 10 ML: at 21:55

## 2018-09-26 RX ADMIN — KETOROLAC TROMETHAMINE 30 MG: 30 INJECTION, SOLUTION INTRAMUSCULAR at 06:02

## 2018-09-26 NOTE — PROGRESS NOTES
Problem: Discharge Planning  Goal: *Discharge to safe environment  Outcome: Progressing Towards Goal  Home with family assistance.

## 2018-09-26 NOTE — PROGRESS NOTES
NUTRITION brief       Diet: regular  Supplements: Ensure Clear TID    Pt and mother visited today with low wt noted. Weight has been stable and no reports of decrease in appetite or wt loss. Pt hungry and happy that diet advanced today. Ate all of breakfast. Will add double portions with meals and HS snack (sandwich/fruit/cookie) based on estimated needs. Ensure Enlive also added a snack PRN (350kcal, 20g protein). No questions or concerns at this time - will rescreen per protocol.    Toshia Monroy RD

## 2018-09-26 NOTE — PROGRESS NOTES
Thoracic Surgery Simple Progress Note    Admit Date: 2018  POD: 1 Day Post-Op      Procedure:  Procedure(s):  EXPLORATORY VATS      Subjective:     Patient has complaints: No significant medical complaints    Review of Systems:    CARDIAC: negative  RESP: positive for PTX  NEURO:  negative  INCISION: Clean, dry, and intact  EXT: Denies new swelling or pain in the legs or calves. Objective:     Blood pressure 117/60, pulse 89, temperature 98.4 °F (36.9 °C), resp. rate 17, height 6' 2\" (1.88 m), weight 131 lb 6.3 oz (59.6 kg), SpO2 97 %. Temp (24hrs), Av.1 °F (36.7 °C), Min:97.2 °F (36.2 °C), Max:98.5 °F (36.9 °C)        Hemodynamics    PAP    CO    CI     0701 -  1900  In: -   Out: 200 [Urine:200]  1901 -  0700  In: 2704 [P.O.:170; I.V.:1050]  Out: 1845 [Urine:1800]    EXAM:  GENERAL: VSS, afrible, alert and cooperative  HEART:  regular rate and rhythm. Mom states he had a cardiac work up at [facility] by Dr Inocencia Valdez in 2016. Will try to obtain records. LUNG: clear to auscultation bilaterally, chest tube in place, scant amount of drainage, no air leak. CXR reviewed  NEURO:  normal without focal findings  mental status, speech normal, alert and oriented x iii  INCISION: Clean, dry, and intact  EXTREMITIES:No evidence of DVT seen on physical exam.  GI/: Abd soft, nonterder with + bowel sounds. Voiding    Labs:No results found for this or any previous visit (from the past 24 hour(s)). Assessment:   No evidence of DVT.   Active Problems:    Spontaneous pneumothorax (2018)      Plan/Recommendations:   Echo today  Ambulate  Chest tube to gravity  Advance diet  CTA final results pending    See orders    Signed By: Bernadette GREENFIELD

## 2018-09-26 NOTE — PROGRESS NOTES
Bedside and Verbal shift change report given to Bonita Roque (oncoming nurse) by Nakul Small (offgoing nurse). Report included the following information SBAR, Kardex and OR Summary.

## 2018-09-26 NOTE — ANESTHESIA POSTPROCEDURE EVALUATION
Post-Anesthesia Evaluation and Assessment Patient: Treva Rivero MRN: [id number]  SSN: [id number] YOB: 1995  Age: 26 y.o. Sex: male Cardiovascular Function/Vital Signs Visit Vitals  /50  Pulse 92  Temp 36.4 °C (97.6 °F)  Resp 16  
 Ht 6' 2\" (1.88 m)  Wt 59.6 kg (131 lb 6.3 oz)  SpO2 97%  BMI 16.87 kg/m2 Patient is status post general anesthesia for Procedure(s): EXPLORATORY VATS. Nausea/Vomiting: None Postoperative hydration reviewed and adequate. Pain: 
Pain Scale 1: Numeric (0 - 10) (09/26/18 0420) Pain Intensity 1: 3 (09/26/18 0420) Managed Neurological Status:  
Neuro (WDL): Within Defined Limits (09/25/18 1341) Neuro Neurologic State: Alert (09/25/18 2059) Orientation Level: Oriented X4 (09/25/18 2059) Cognition: Follows commands; Appropriate decision making; Appropriate for age attention/concentration; Appropriate safety awareness (09/25/18 2059) Speech: Appropriate for age;Clear (09/25/18 2059) LUE Motor Response: Purposeful (09/25/18 2059) LLE Motor Response: Purposeful (09/25/18 2059) RUE Motor Response: Purposeful (09/25/18 2059) RLE Motor Response: Purposeful (09/25/18 2059) At baseline Mental Status and Level of Consciousness: Arousable Pulmonary Status:  
O2 Device: Room air (09/26/18 0420) Adequate oxygenation and airway patent Complications related to anesthesia: None Post-anesthesia assessment completed. No concerns Signed By: Rachelle Schaefer MD   
 September 26, 2018

## 2018-09-26 NOTE — PROGRESS NOTES
Care Management Interventions  PCP Verified by CM: Yes Monalisa Peck MD)  Transition of Care Consult (CM Consult): Other (None)  MyChart Signup: No  Discharge Durable Medical Equipment: No  Physical Therapy Consult: No  Occupational Therapy Consult: No  Speech Therapy Consult: No  Current Support Network: Other (Lives with parents and sister)  Confirm Follow Up Transport: Family  Plan discussed with Pt/Family/Caregiver: Yes  Freedom of Choice Offered:  (N/A)  Discharge Location  Discharge Placement: Home with family assistance    Reason for Admission:   Spontaneous pneumothorax                     S/P Exploratory VATS-Left on 9/25/18                   RRAT Score:            7           Plan for utilizing home health:      N/A                    Likelihood of Readmission:          Low                         Transition of Care Plan:      CM met with patient and his mother. Patient lives with both parents and 25year old sister. Patient reports good family /social support. Patient is ambulatory and expects return to independent functioning. Patient confirmed PCP, health insurance, and prescription coverage. Transport at discharge will be provided by family. No discharge planning needs presented at this time.

## 2018-09-26 NOTE — ROUTINE PROCESS
Bedside shift change report given to Ruben Gan (oncoming nurse) by Cynthia Kramer (offgoing nurse). Report included the following information SBAR, Intake/Output and Cardiac Rhythm NSR.

## 2018-09-27 ENCOUNTER — APPOINTMENT (OUTPATIENT)
Dept: GENERAL RADIOLOGY | Age: 23
DRG: 166 | End: 2018-09-27
Attending: NURSE PRACTITIONER
Payer: OTHER GOVERNMENT

## 2018-09-27 VITALS
DIASTOLIC BLOOD PRESSURE: 63 MMHG | BODY MASS INDEX: 16.58 KG/M2 | SYSTOLIC BLOOD PRESSURE: 117 MMHG | HEIGHT: 74 IN | TEMPERATURE: 99 F | WEIGHT: 129.19 LBS | HEART RATE: 94 BPM | OXYGEN SATURATION: 98 % | RESPIRATION RATE: 18 BRPM

## 2018-09-27 PROCEDURE — 74011250636 HC RX REV CODE- 250/636: Performed by: THORACIC SURGERY (CARDIOTHORACIC VASCULAR SURGERY)

## 2018-09-27 PROCEDURE — 74011250637 HC RX REV CODE- 250/637: Performed by: THORACIC SURGERY (CARDIOTHORACIC VASCULAR SURGERY)

## 2018-09-27 PROCEDURE — 71045 X-RAY EXAM CHEST 1 VIEW: CPT

## 2018-09-27 PROCEDURE — 74011250637 HC RX REV CODE- 250/637: Performed by: NURSE PRACTITIONER

## 2018-09-27 RX ORDER — ONDANSETRON 4 MG/1
4 TABLET, ORALLY DISINTEGRATING ORAL
Qty: 20 TAB | Refills: 0 | Status: SHIPPED | OUTPATIENT
Start: 2018-09-27 | End: 2019-01-15

## 2018-09-27 RX ORDER — HYDROCODONE BITARTRATE AND ACETAMINOPHEN 5; 325 MG/1; MG/1
1-2 TABLET ORAL
Status: DISCONTINUED | OUTPATIENT
Start: 2018-09-27 | End: 2018-09-27 | Stop reason: HOSPADM

## 2018-09-27 RX ORDER — HYDROCODONE BITARTRATE AND ACETAMINOPHEN 5; 325 MG/1; MG/1
1-2 TABLET ORAL
Qty: 30 TAB | Refills: 0 | Status: SHIPPED | OUTPATIENT
Start: 2018-09-27 | End: 2019-01-15

## 2018-09-27 RX ADMIN — ONDANSETRON 4 MG: 2 INJECTION INTRAMUSCULAR; INTRAVENOUS at 07:01

## 2018-09-27 RX ADMIN — HYDROCODONE BITARTRATE AND ACETAMINOPHEN 1 TABLET: 5; 325 TABLET ORAL at 12:50

## 2018-09-27 RX ADMIN — OXYCODONE HYDROCHLORIDE AND ACETAMINOPHEN 1 TABLET: 5; 325 TABLET ORAL at 04:49

## 2018-09-27 RX ADMIN — OXYCODONE HYDROCHLORIDE AND ACETAMINOPHEN 1 TABLET: 5; 325 TABLET ORAL at 02:58

## 2018-09-27 NOTE — ADVANCED PRACTICE NURSE
Chest tube removed without incidence  At 1005  Post pull CXR at 1200  C/O N&V thinks its caused be Percocet  Will change pain medication

## 2018-09-27 NOTE — DISCHARGE INSTRUCTIONS
Acute Pain After Surgery: Care Instructions  Your Care Instructions    It's common to have some pain after surgery. Pain doesn't mean that something is wrong or that the surgery didn't go well. But when the pain is severe, it's important to work with your doctor to manage it. It's also important to be aware of a few facts about pain and pain medicine. · You are the only person who knows what your pain feels like. So be sure to tell your doctor when you are in pain or when the pain changes. Then he or she will know how to adjust your medicines. · Pain is often easier to control right after it starts. So it may be better to take regular doses of pain medicine and not wait until the pain gets bad. · Medicine can help control pain. But this doesn't mean you'll have no pain. Medicine works to keep the pain at a level you can live with. With time, you will feel better. Follow-up care is a key part of your treatment and safety. Be sure to make and go to all appointments, and call your doctor if you are having problems. It's also a good idea to know your test results and keep a list of the medicines you take. How can you care for yourself at home? · Be safe with medicines. Read and follow all instructions on the label. ¨ If the doctor gave you a prescription medicine for pain, take it as prescribed. ¨ If you are not taking a prescription pain medicine, ask your doctor if you can take an over-the-counter medicine. · If you take an over-the-counter pain medicine, such as acetaminophen (Tylenol), ibuprofen (Advil, Motrin), or naproxen (Aleve), read and follow all instructions on the label. · Do not take two or more pain medicines at the same time unless the doctor told you to. · Do not drink alcohol while you are taking pain medicines. · Try to walk each day if your doctor recommends it. Start by walking a little more than you did the day before. Bit by bit, increase the amount you walk.  Walking increases blood flow. It also helps prevent pneumonia and constipation. · To prevent constipation from opioid pain medicines:  ¨ Talk to your doctor about a laxative. ¨ Include fruits, vegetables, beans, and whole grains in your diet each day. These foods are high in fiber. ¨ Drink plenty of fluids, enough so that your urine is light yellow or clear like water. Drink water, fruit juice, or other drinks that do not contain caffeine or alcohol. If you have kidney, heart, or liver disease and have to limit fluids, talk with your doctor before you increase the amount of fluids you drink. ¨ Take a fiber supplement, such as Citrucel or Metamucil, every day if needed. Read and follow all instructions on the label. If you take pain medicine for more than a few days, talk to your doctor before you take fiber. When should you call for help? Call your doctor now or seek immediate medical care if:    · Your pain gets worse.     · Your pain is not controlled by medicine.    Watch closely for changes in your health, and be sure to contact your doctor if you have any problems. Where can you learn more? Go to http://sandra-ruthann.info/. Enter (19) 709-024 in the search box to learn more about \"Acute Pain After Surgery: Care Instructions. \"  Current as of: November 20, 2017  Content Version: 11.7  © 2668-3343 [employer]. Care instructions adapted under license by [employer] (which disclaims liability or warranty for this information). If you have questions about a medical condition or this instruction, always ask your healthcare professional. [place] [address] any warranty or liability for your use of this information.   *DISCHARGE INSTRUCTIONS AFTER LUNG [address] [facility]  [address] [place] [address]  [phone]    Leave the chest tube dressing in place for 48 hours (9/23/18), then you can remove it and shower. SURGICAL INCISION:    You will have two or three small incisions. These incisions are sealed with sutures that dissolve. The lower incision is from the chest tube. This lower incision will seal itself in 5 to 7 days. Until then you may have drainage from that incision. The drainage will be thin yellowish or red in color and may drain for 5 to 7 days. This is normal and expected. INCISION CARE:    Wash incisions daily with soap. Pat dry. Showers only, no tub baths. If you have drainage, you can place a bandage over the area, otherwise keep open to air. You may experience drainage of clear-strawberry colored fluid from the chest tube site. This is to be expected and will stop in 24-48 hours. PAIN:    What you will experience:     Tenderness and soreness around incisions   Burning and/or numbness   Soreness around front/back of chest    For relief of discomfort:     Take the pain medicine you were given at discharge   Aleve one or two tablets every 12 hrs (with food) along with pain medicine (this can be purchased over the counter at your local pharmacy/drug store). Advil may be substituted. Start this after you finish taking Toradol if prescribed.  Heating pad 10 minutes at a time to the upper incision area as often as you wish.  For the Ladies: Spandex or elastic sports bra will give you the support you need without pressure on the incision. Most will find this to be a great comfort. COUGHING:    Coughing is helpful after lung surgery. Place a pillow over your incision and apply pressure when coughing to reduce pain. ACTIVITY:    DO: 1. Walk daily outside if weather permits, at a mall if not. Increase your distance                       each day. Exercise has many benefits. It promotes healing, expands your lungs,                helps you cough, relaxes your body, tones muscles, lowers blood pressure and               improves your appetite.  After you exercise expect to feel tired and probably short                of breath. Plan to take a nap 1-2 times a day to regain your strength . 2. Climb stairs           3. Ride in a car           4. Perform breathing exercises (incentive spirometer)    DO NOT:               1. Lift heavy objects (8-10 pounds)  2. Drive a car/truck until after your post-op visit  3. Do heavy yard or housework     APPETITE:    It is usual to have a decreased appetite after surgery. Exercise is the best stimulant. You may expect to slowly improve over 4-10 days. CALL THE OFFICE IF YOU DEVELOP:     Increasing pain that is not controlled by the pain medicine.  Increasing redness or drainage around the incision.  Unusual or increasing shortness of breath   Fever greater than 101 degrees   Change in the color of your sputum to yellow or green, especially if you also have increasing shortness of breath and a fever greater than 101. YOUR MEDICATIONS:  As instructed        FOLLOW-UP APPOINTMENT:  AFTER DISCHARGE CALL THE OFFICE TO SCHEDULE YOUR VISIT TO SEE US IN 10 to 14 DAYS. Please arrive 45 minutes prior to you appointment time in order to have a chest X-Ray. Check in at [facility] on the ground floor of the [facility]. After your X-ray come to the [address] [place] [address] for your appointment. Physician Signature    . I have reviewed discharge instructions with the patient and parent. The patient and parent verbalized understanding.

## 2018-09-27 NOTE — PROGRESS NOTES
Bedside and Verbal shift change report given to Thalia (oncoming nurse) by Todd Minaya (offgoing nurse). Report included the following information SBAR, OR Summary, Procedure Summary, Intake/Output, MAR, Accordion, Med Rec Status and Cardiac Rhythm normal sinus.

## 2018-09-27 NOTE — PROGRESS NOTES
Spiritual Care Partner Volunteer visited patient in Rm 437 on 9/27/18. Documented by:   Chaplain Russell MDiv, MACE  [phone])

## 2018-09-27 NOTE — PROGRESS NOTES
Problem: Falls - Risk of  Goal: *Absence of Falls  Document Sheri Fall Risk and appropriate interventions in the flowsheet.    Outcome: Progressing Towards Goal  Fall Risk Interventions:            Medication Interventions: Evaluate medications/consider consulting pharmacy                  Problem: Lung Procedures/VATS Pathway: Post-Op Day 3  Goal: *Demonstrates progressive activity  Outcome: Progressing Towards Goal  Need to encourage increased frequency of ambulation

## 2018-09-27 NOTE — PROGRESS NOTES
Bedside and Verbal shift change report given to darryl RN (oncoming nurse) by Kalpesh Rm RN (offgoing nurse). Report included the following information SBAR, Kardex, MAR and Cardiac Rhythm NSR .

## 2018-09-27 NOTE — PROGRESS NOTES
Thoracic Surgery Simple Progress Note    Admit Date: 2018  POD: 2 Days Post-Op      Procedure:  Procedure(s):  EXPLORATORY VATS      Subjective:     Patient has complaints: No significant medical complaints    Review of Systems:    CARDIAC: negative  RESP: positive for PTX  NEURO:  negative  INCISION: Clean, dry, and intact  EXT: Denies new swelling or pain in the legs or calves. Objective:     Blood pressure 135/70, pulse 83, temperature 98 °F (36.7 °C), resp. rate 18, height 6' 2\" (1.88 m), weight 131 lb 6.3 oz (59.6 kg), SpO2 98 %. Temp (24hrs), Av.2 °F (36.8 °C), Min:97.9 °F (36.6 °C), Max:98.6 °F (37 °C)        Hemodynamics    PAP    CO    CI        1901 -  0700  In: 770 [P.O.:770]  Out: 1160 [Urine:1125]    EXAM:  GENERAL: VSS, afrible, alert and cooperative. His low BMI is related to his body habitus and consistent with Marfan's syndrome. HEART:  regular rate and rhythm  LUNG: clear to auscultation bilaterally, chest tube in place with no air leak or output. CXR reviewed  NEURO:  normal without focal findings  mental status, speech normal, alert and oriented x iii  INCISION: Clean, dry, and intact  EXTREMITIES:No evidence of DVT seen on physical exam.  GI/: Abd soft, nonterder with + bowel sounds. Voiding    Labs:No results found for this or any previous visit (from the past 24 hour(s)). Assessment:   No evidence of DVT.   Active Problems:    Spontaneous pneumothorax (2018)      Plan/Recommendations:   Plan to remove chest tube later this morning  If post pull CXR is stable will discharge him home  See orders    Signed By: Anmol GREENFIELD

## 2018-09-27 NOTE — ROUTINE PROCESS
Bedside shift change report given to Fanny Polo (oncoming nurse) by Tennille Parmar (offgoing nurse). Report included the following information SBAR.

## 2018-09-27 NOTE — DISCHARGE SUMMARY
Physician Discharge Summary     Patient ID:  Daily Blackburn  [id number]  24 y.o.  1995    Admit Date: 9/24/2018    Discharge Date: 9/27/2018    Admission Diagnoses: Spontaneous pneumothorax  resp. insufficiency    Discharge Diagnoses: Active Problems:    Spontaneous pneumothorax (9/24/2018)         Admission Condition: Fair    Discharge Condition: Fair    Last Procedure: Procedure(s):  EXPLORATORY VATS      Hospital Course: Normal for this procedure. Surgery on 9/25/18, chest tube to suction until 9/26/18. Chest tube removed on 9/27/18. On 9/26/18 he had an Echocardiogram as part of the work up for Marfan's syndrome. His low BMI and body habitus are consistent with Marfan's. Consults: None    Significant Diagnostic Studies: labs, radiology: CXR and chest CT and cardiac graphics: Telemetry and Echocardiogram    PATH: Benign                          Disposition: Home with his parents    Patient Instructions:   Current Discharge Medication List      START taking these medications    Details   HYDROcodone-acetaminophen (NORCO) 5-325 mg per tablet Take 1-2 Tabs by mouth every four (4) hours as needed. Max Daily Amount: 12 Tabs. Qty: 30 Tab, Refills: 0    Associated Diagnoses: Post-op pain      ondansetron (ZOFRAN ODT) 4 mg disintegrating tablet Take 1 Tab by mouth every eight (8) hours as needed for Nausea. Indications: PREVENTION OF POST-OPERATIVE NAUSEA AND VOMITING  Qty: 20 Tab, Refills: 0         CONTINUE these medications which have NOT CHANGED    Details   EPINEPHrine (EPIPEN JR) 0.15 mg/0.3 mL injection 0.15 mg by IntraMUSCular route once as needed. Activity: Activity as tolerated and No lifting, Driving, or Strenuous exercise for 2-3 weeks  Diet: Resume previous diet  Wound Care: As directed    Follow-up with Thoracic Surgery  in 10 to 14 days.   Follow-up tests/labs CXR  Needs to have an eye exam by an Ophalmologist, his mother is awhere    Sign:  Leonidas Mckay NP

## 2018-09-27 NOTE — PROGRESS NOTES
Hospital follow-up PCP transitional care appointment has been scheduled with Dr. Unruly Pickard for Wednesday, 10/3/18 at 3:20 p.m. Pending patient discharge.   Tg [place], Care Management Specialist.

## 2018-09-27 NOTE — ROUTINE PROCESS
I have reviewed discharge instructions with the patient and parent. The patient and parent verbalized understanding. Opportunity for questions provided.     Taken to discharge via wheelchair by [place], PCT

## 2018-10-03 ENCOUNTER — OFFICE VISIT (OUTPATIENT)
Dept: FAMILY MEDICINE CLINIC | Age: 23
End: 2018-10-03

## 2018-10-03 VITALS
BODY MASS INDEX: 16.53 KG/M2 | WEIGHT: 128.8 LBS | TEMPERATURE: 98.1 F | HEART RATE: 120 BPM | OXYGEN SATURATION: 97 % | DIASTOLIC BLOOD PRESSURE: 74 MMHG | SYSTOLIC BLOOD PRESSURE: 119 MMHG | RESPIRATION RATE: 17 BRPM | HEIGHT: 74 IN

## 2018-10-03 DIAGNOSIS — J93.83 SPONTANEOUS PNEUMOTHORAX: Primary | ICD-10-CM

## 2018-10-03 DIAGNOSIS — T78.2XXA ANAPHYLAXIS, INITIAL ENCOUNTER: ICD-10-CM

## 2018-10-03 DIAGNOSIS — Z09 HOSPITAL DISCHARGE FOLLOW-UP: ICD-10-CM

## 2018-10-03 RX ORDER — EPINEPHRINE 0.3 MG/.3ML
0.3 INJECTION SUBCUTANEOUS
Qty: 2 SYRINGE | Refills: 0 | Status: SHIPPED | OUTPATIENT
Start: 2018-10-03 | End: 2018-10-03

## 2018-10-03 NOTE — MR AVS SNAPSHOT
[address] 
[phone] Patient: Óscar Urban MRN: [id number] HDL:1/0/8852 Visit Information Date & Time Provider Department Dept. Phone Encounter #  
 10/3/2018  3:20 PM Radha Naik, [facility] [phone] [id number] Follow-up Instructions Return if symptoms worsen or fail to improve. Your Appointments 10/8/2018  2:30 PM  
POST OP with Nora Dolan  [address] [facility] [address] [place] [address]) Appt Note: po 2 week f/u 9/25/18 RF:EXPLORATORY VATS  
 [facility] [address] Upcoming Health Maintenance Date Due  
 HPV Age 9Y-34Y (3 of 1 - Male 3 Dose Series) 2/3/2008 Influenza Age 5 to Adult 8/1/2018 DTaP/Tdap/Td series (7 - Td) 1/14/2019 Allergies as of 10/3/2018  Review Complete On: 10/3/2018 By: Radha Naik MD  
  
 Severity Noted Reaction Type Reactions Egg High 09/24/2018    Anaphylaxis Eggshell Membrane High 02/02/2017    Swelling Hazelnut  02/02/2017   Topical Other (comments) Current Immunizations  Reviewed on 9/26/2018 Name Date DTaP 4/13/2001, 7/6/2000, 4/13/1998, [birth year] Hep A Vaccine 1/14/2009, 8/3/2006 Hep B Vaccine 1995, [birth year] Hib 7/6/2000, 4/13/1998, 1995, 1995 MMR 4/13/2001, 4/13/1998 Meningococcal (MCV4P) Vaccine 2/6/2013, 1/14/2009 Poliovirus vaccine 4/13/2001, 1995, 1997, 1997 Tdap 1/14/2009 Varicella Virus Vaccine 6/9/2010, 7/10/1998 Not reviewed this visit You Were Diagnosed With   
  
 Codes Comments Anaphylaxis, initial encounter    -  Primary ICD-10-CM: T78. 2XXA ICD-9-CM: 995.0 Vitals BP Pulse Temp Resp Height(growth percentile) Weight(growth percentile) 119/74 (!) 120 98.1 °F (36.7 °C) (Oral) 17 6' 2\" (1.88 m) 128 lb 12.8 oz (58.4 kg) SpO2 BMI Smoking Status 97% 16.54 kg/m2 Never Smoker Vitals History BMI and BSA Data Body Mass Index Body Surface Area  
 16.54 kg/m 2 1.75 m 2 Preferred Pharmacy Pharmacy Name Phone [facility] [address] [facility] [address] AT [address] [place] [address] [phone] Your Updated Medication List  
  
   
This list is accurate as of 10/3/18  4:35 PM.  Always use your most recent med list.  
  
  
  
  
 EPINEPHrine 0.3 mg/0.3 mL injection Commonly known as:  EPIPEN 2-KALIA  
0.3 mL by IntraMUSCular route once as needed for up to 1 dose. HYDROcodone-acetaminophen 5-325 mg per tablet Commonly known as:  Nico Leroy Take 1-2 Tabs by mouth every four (4) hours as needed. Max Daily Amount: 12 Tabs. ondansetron 4 mg disintegrating tablet Commonly known as:  ZOFRAN ODT Take 1 Tab by mouth every eight (8) hours as needed for Nausea. Indications: PREVENTION OF POST-OPERATIVE NAUSEA AND VOMITING Prescriptions Sent to Pharmacy Refills EPINEPHrine (EPIPEN 2-KALIA) 0.3 mg/0.3 mL injection 0 Si.3 mL by IntraMUSCular route once as needed for up to 1 dose. Class: Normal  
 Pharmacy: [facility] [address] [facility] [address] AT [address] [place] [address] ([facility] #: [phone] Route: IntraMUSCular Follow-up Instructions Return if symptoms worsen or fail to improve. Patient Instructions Collapsed Lung: Care Instructions Your Care Instructions A collapsed lung (pneumothorax) is a buildup of air in the space between the lung and the chest wall. As more air builds up in this space, the pressure against the lung makes the lung collapse. This causes shortness of breath and chest pain because your lung cannot fully expand. A collapsed lung is usually caused by an injury to the chest, but it may also occur suddenly without an injury because of a lung illness, such as emphysema or lung fibrosis. Your lung may collapse after lung surgery or another medical procedure. Sometimes it happens for no known reason in an otherwise healthy person (spontaneous pneumothorax). Treatment depends on the cause of the collapse. It may heal with rest, although your doctor will want to keep track of your progress. It can take several days for the lung to expand again. Your doctor may have drained the air with a needle or tube inserted into the space between your chest and the collapsed lung. If you have a chest tube, be sure to follow your doctor's instructions about how to care for the tube. You may need further treatment if you are not getting better. Surgery is sometimes needed to keep the lung inflated. The doctor will want to keep track of your progress, so you will need a follow-up exam within a few days. The doctor has checked you carefully, but problems can develop later. If you notice any problems or new symptoms, get medical treatment right away. Follow-up care is a key part of your treatment and safety. Be sure to make and go to all appointments, and call your doctor if you are having problems. It's also a good idea to know your test results and keep a list of the medicines you take. How can you care for yourself at home? · Get plenty of rest and sleep. You may feel weak and tired for a while, but your energy level will improve with time. · Hold a pillow against your chest when you cough or take deep breaths. This will support your chest and decrease your pain. · Take pain medicines exactly as directed. ¨ If the doctor gave you a prescription medicine for pain, take it as prescribed. ¨ If you are not taking a prescription pain medicine, ask your doctor if you can take an over-the-counter medicine. · If your doctor prescribed antibiotics, take them as directed. Do not stop taking them just because you feel better. You need to take the full course of antibiotics. · If you have a bandage over your chest tube, or the place where the chest tube was inserted, keep it clean and dry. Follow your doctor's instructions on bandage care. · If you go home with a tube in place, follow the doctor's directions. Do not adjust the tube in any way. This could break the seal or cause other problems. Keep the tube dry. · Avoid any movements that require your muscles, especially your chest muscles, to strain. Such movements include laughing hard, bearing down to have a bowel movement, and heavy lifting. Try not to cough. · Do not fly in an airplane until your doctor tells you it is okay. Avoid any situations where there is increased air pressure. · Do not smoke or allow others to smoke around you. If you need help quitting, talk to your doctor about stop-smoking programs and medicines. These can increase your chances of quitting for good. When should you call for help? Call 911 anytime you think you may need emergency care. For example, call if: 
  · You have severe trouble breathing.  
  · You passed out (lost consciousness).  
 Call your doctor now or seek immediate medical care if: 
  · You have new or worse trouble breathing.  
  · You have new pain or your pain gets worse.  
  · You have a fever.  
  · You cough up blood.  
  · Your chest tube starts to come out or falls out.  
  · You are bleeding through the bandage where the tube was put in.  
 Watch closely for changes in your health, and be sure to contact your doctor if: 
  · The skin around the place where the chest tube was put in is red or irritated.  
  · You do not get better as expected. Where can you learn more? Go to http://sandra-ruthann.info/. Enter W332 in the search box to learn more about \"Collapsed Lung: Care Instructions. \" 
 Current as of: December 6, 2017 Content Version: 11.7 © 5223-2397 [employer]. Care instructions adapted under license by [email or web address] (which disclaims liability or warranty for this information). If you have questions about a medical condition or this instruction, always ask your healthcare professional. [address] any warranty or liability for your use of this information. Marfan Syndrome: Care Instructions Your Care Instructions Marfan syndrome causes problems with connective tissues. These tissues include skin, muscles, blood vessels, ligaments, bones, and tendons. They join parts of the body and help hold the body together. Marfan syndrome is caused by a flaw in a gene that helps make connective tissue. The condition is often passed down in families. If someone in the family has Marfan syndrome, his or her close relatives may need to be examined by a doctor who is familiar with it. Marfan syndrome may cause heart or aorta problems, a curved spine, vision trouble, and pain if the nerves are affected. Some people may have mild problems, while others have more serious symptoms. Most people with Marfan syndrome tend to be tall and thin with long arms, legs, fingers, and toes. They usually have loose joints. Heart and blood vessel problems are among the most serious effects of Marfan syndrome. The heart's valves may leak. Part of the aorta may become weak and stretch, forming an aneurysm. The aorta carries blood to the upper and lower body. An aneurysm may cause the aorta to tear. A torn aorta is the most common cause of death in people with Marfan syndrome. Doctors usually can treat the problems caused by Marfan syndrome. For example, you may take medicine to lower your heart rate and blood pressure, which reduces stress on the aorta. Meeting with a genetic doctor or counselor can help you learn more about Marfan syndrome and what it means to your family. Follow-up care is a key part of your treatment and safety. Be sure to make and go to all appointments, and call your doctor if you are having problems. It's also a good idea to know your test results and keep a list of the medicines you take. How can you care for yourself at home? · Take your medicines exactly as prescribed. Call your doctor if you think you are having a problem with your medicine. · Do not smoke. People with Marfan syndrome are already at risk for heart and lung problems. If you need help quitting, talk to your doctor about stop-smoking programs and medicines. These can increase your chances of quitting for good. · Talk to your doctor before starting an exercise program or playing team sports. Contact sports can be dangerous because your blood vessels and joints are weaker than normal. 
· Wear a medical alert bracelet which says that you have Marfan syndrome. You can buy this at most drugstores. · Write or type a list of any conditions you have that are related to Marfan syndrome. Also include a list of medicines you take. Keep these lists in several places, including with you in case of an emergency. When should you call for help? Call 911 anytime you think you may need emergency care. For example, call if: 
  · You have severe chest, belly, or back pain.  
  · You passed out (lost consciousness).  
  · You have symptoms of a heart attack. These may include: ¨ Chest pain or pressure, or a strange feeling in the chest. 
¨ Sweating. ¨ Shortness of breath. ¨ Nausea or vomiting. ¨ Pain, pressure, or a strange feeling in the back, neck, jaw, or upper belly or in one or both shoulders or arms. ¨ Lightheadedness or sudden weakness. ¨ A fast or irregular heartbeat. After you call 911, the  may tell you to chew 1 adult-strength or 2 to 4 low-dose aspirin. Wait for an ambulance. Do not try to drive yourself.  
 Call your doctor now or seek immediate medical care if:   · You have upper back or belly pain.  
  · You are coughing or wheezing.  
  · You have a hoarse voice.  
  · You have a pulsing feeling in your belly or back.  
 Watch closely for changes in your health, and be sure to contact your doctor if: 
  · You have questions about Marfan syndrome. Where can you learn more? Go to http://sandra-ruthann.info/. Enter V405 in the search box to learn more about \"Marfan Syndrome: Care Instructions. \" Current as of: November 21, 2017 Content Version: 11.7 © 8362-4883 [employer]. Care instructions adapted under license by [employer] (which disclaims liability or warranty for this information). If you have questions about a medical condition or this instruction, always ask your healthcare professional. [address] any warranty or liability for your use of this information. Introducing [facility]! [facility] introduces Fuhu patient portal. Now you can access parts of your medical record, email your doctor's office, and request medication refills online. 1. In your internet browser, go to [email or web address] 2. Click on the First Time User? Click Here link in the Sign In box. You will see the New Member Sign Up page. 3. Enter your Fuhu Access Code exactly as it appears below. You will not need to use this code after youve completed the sign-up process. If you do not sign up before the expiration date, you must request a new code. · Fuhu Access Code: [email or web address] Expires: 12/23/2018  2:31 PM 
 
4. Enter the last four digits of your Social Security Number (xxxx) and Date of Birth (mm/dd/yyyy) as indicated and click Submit. You will be taken to the next sign-up page. 5. Create a Fuhu ID. This will be your Fuhu login ID and cannot be changed, so think of one that is secure and easy to remember. 6. Create a Yoolink password. You can change your password at any time. 7. Enter your Password Reset Question and Answer. This can be used at a later time if you forget your password. 8. Enter your e-mail address. You will receive e-mail notification when new information is available in [address] 9. Click Sign Up. You can now view and download portions of your medical record. 10. Click the Download Summary menu link to download a portable copy of your medical information. If you have questions, please visit the Frequently Asked Questions section of the Yoolink website. Remember, Yoolink is NOT to be used for urgent needs. For medical emergencies, dial 911. Now available from your iPhone and Android! Please provide this summary of care documentation to your next provider. Your primary care clinician is listed as Murray Davidson. If you have any questions after today's visit, please call [phone].

## 2018-10-03 NOTE — PATIENT INSTRUCTIONS
Collapsed Lung: Care Instructions Your Care Instructions A collapsed lung (pneumothorax) is a buildup of air in the space between the lung and the chest wall. As more air builds up in this space, the pressure against the lung makes the lung collapse. This causes shortness of breath and chest pain because your lung cannot fully expand. A collapsed lung is usually caused by an injury to the chest, but it may also occur suddenly without an injury because of a lung illness, such as emphysema or lung fibrosis. Your lung may collapse after lung surgery or another medical procedure. Sometimes it happens for no known reason in an otherwise healthy person (spontaneous pneumothorax). Treatment depends on the cause of the collapse. It may heal with rest, although your doctor will want to keep track of your progress. It can take several days for the lung to expand again. Your doctor may have drained the air with a needle or tube inserted into the space between your chest and the collapsed lung. If you have a chest tube, be sure to follow your doctor's instructions about how to care for the tube. You may need further treatment if you are not getting better. Surgery is sometimes needed to keep the lung inflated. The doctor will want to keep track of your progress, so you will need a follow-up exam within a few days. The doctor has checked you carefully, but problems can develop later. If you notice any problems or new symptoms, get medical treatment right away. Follow-up care is a key part of your treatment and safety. Be sure to make and go to all appointments, and call your doctor if you are having problems. It's also a good idea to know your test results and keep a list of the medicines you take. How can you care for yourself at home? · Get plenty of rest and sleep. You may feel weak and tired for a while, but your energy level will improve with time. · Hold a pillow against your chest when you cough or take deep breaths. This will support your chest and decrease your pain. · Take pain medicines exactly as directed. ¨ If the doctor gave you a prescription medicine for pain, take it as prescribed. ¨ If you are not taking a prescription pain medicine, ask your doctor if you can take an over-the-counter medicine. · If your doctor prescribed antibiotics, take them as directed. Do not stop taking them just because you feel better. You need to take the full course of antibiotics. · If you have a bandage over your chest tube, or the place where the chest tube was inserted, keep it clean and dry. Follow your doctor's instructions on bandage care. · If you go home with a tube in place, follow the doctor's directions. Do not adjust the tube in any way. This could break the seal or cause other problems. Keep the tube dry. · Avoid any movements that require your muscles, especially your chest muscles, to strain. Such movements include laughing hard, bearing down to have a bowel movement, and heavy lifting. Try not to cough. · Do not fly in an airplane until your doctor tells you it is okay. Avoid any situations where there is increased air pressure. · Do not smoke or allow others to smoke around you. If you need help quitting, talk to your doctor about stop-smoking programs and medicines. These can increase your chances of quitting for good. When should you call for help? Call 911 anytime you think you may need emergency care. For example, call if: 
  · You have severe trouble breathing.  
  · You passed out (lost consciousness).  
 Call your doctor now or seek immediate medical care if: 
  · You have new or worse trouble breathing.  
  · You have new pain or your pain gets worse.  
  · You have a fever.  
  · You cough up blood.  
  · Your chest tube starts to come out or falls out.  
  · You are bleeding through the bandage where the tube was put in.  Watch closely for changes in your health, and be sure to contact your doctor if: 
  · The skin around the place where the chest tube was put in is red or irritated.  
  · You do not get better as expected. Where can you learn more? Go to http://sandra-ruthann.info/. Enter M572 in the search box to learn more about \"Collapsed Lung: Care Instructions. \" Current as of: December 6, 2017 Content Version: 11.7 © 4036-9166 [employer]. Care instructions adapted under license by [employer] (which disclaims liability or warranty for this information). If you have questions about a medical condition or this instruction, always ask your healthcare professional. [place] [address] any warranty or liability for your use of this information. Marfan Syndrome: Care Instructions Your Care Instructions Marfan syndrome causes problems with connective tissues. These tissues include skin, muscles, blood vessels, ligaments, bones, and tendons. They join parts of the body and help hold the body together. Marfan syndrome is caused by a flaw in a gene that helps make connective tissue. The condition is often passed down in families. If someone in the family has Marfan syndrome, his or her close relatives may need to be examined by a doctor who is familiar with it. Marfan syndrome may cause heart or aorta problems, a curved spine, vision trouble, and pain if the nerves are affected. Some people may have mild problems, while others have more serious symptoms. Most people with Marfan syndrome tend to be tall and thin with long arms, legs, fingers, and toes. They usually have loose joints. Heart and blood vessel problems are among the most serious effects of Marfan syndrome. The heart's valves may leak. Part of the aorta may become weak and stretch, forming an aneurysm. The aorta carries blood to the upper and lower body. An aneurysm may cause the aorta to tear.  A torn aorta is the most common cause of death in people with Marfan syndrome. Doctors usually can treat the problems caused by Marfan syndrome. For example, you may take medicine to lower your heart rate and blood pressure, which reduces stress on the aorta. Meeting with a genetic doctor or counselor can help you learn more about Marfan syndrome and what it means to your family. Follow-up care is a key part of your treatment and safety. Be sure to make and go to all appointments, and call your doctor if you are having problems. It's also a good idea to know your test results and keep a list of the medicines you take. How can you care for yourself at home? · Take your medicines exactly as prescribed. Call your doctor if you think you are having a problem with your medicine. · Do not smoke. People with Marfan syndrome are already at risk for heart and lung problems. If you need help quitting, talk to your doctor about stop-smoking programs and medicines. These can increase your chances of quitting for good. · Talk to your doctor before starting an exercise program or playing team sports. Contact sports can be dangerous because your blood vessels and joints are weaker than normal. 
· Wear a medical alert bracelet which says that you have Marfan syndrome. You can buy this at most drugstores. · Write or type a list of any conditions you have that are related to Marfan syndrome. Also include a list of medicines you take. Keep these lists in several places, including with you in case of an emergency. When should you call for help? Call 911 anytime you think you may need emergency care. For example, call if: 
  · You have severe chest, belly, or back pain.  
  · You passed out (lost consciousness).  
  · You have symptoms of a heart attack. These may include: ¨ Chest pain or pressure, or a strange feeling in the chest. 
¨ Sweating. ¨ Shortness of breath. ¨ Nausea or vomiting. ¨ Pain, pressure, or a strange feeling in the back, neck, jaw, or upper belly or in one or both shoulders or arms. ¨ Lightheadedness or sudden weakness. ¨ A fast or irregular heartbeat. After you call 911, the  may tell you to chew 1 adult-strength or 2 to 4 low-dose aspirin. Wait for an ambulance. Do not try to drive yourself.  
 Call your doctor now or seek immediate medical care if: 
  · You have upper back or belly pain.  
  · You are coughing or wheezing.  
  · You have a hoarse voice.  
  · You have a pulsing feeling in your belly or back.  
 Watch closely for changes in your health, and be sure to contact your doctor if: 
  · You have questions about Marfan syndrome. Where can you learn more? Go to http://sandra-ruthann.info/. Enter V405 in the search box to learn more about \"Marfan Syndrome: Care Instructions. \" Current as of: November 21, 2017 Content Version: 11.7 © 7957-1531 [employer]. Care instructions adapted under license by [employer] (which disclaims liability or warranty for this information). If you have questions about a medical condition or this instruction, always ask your healthcare professional. [address] any warranty or liability for your use of this information.

## 2018-10-08 ENCOUNTER — OFFICE VISIT (OUTPATIENT)
Dept: SURGERY | Age: 23
End: 2018-10-08

## 2018-10-08 ENCOUNTER — HOSPITAL ENCOUNTER (OUTPATIENT)
Dept: GENERAL RADIOLOGY | Age: 23
Discharge: HOME | End: 2018-10-08
Payer: OTHER GOVERNMENT

## 2018-10-08 VITALS
BODY MASS INDEX: 16.43 KG/M2 | WEIGHT: 128 LBS | HEIGHT: 74 IN | DIASTOLIC BLOOD PRESSURE: 68 MMHG | SYSTOLIC BLOOD PRESSURE: 110 MMHG | HEART RATE: 111 BPM | OXYGEN SATURATION: 97 % | RESPIRATION RATE: 18 BRPM

## 2018-10-08 DIAGNOSIS — J93.83 SPONTANEOUS PNEUMOTHORAX: ICD-10-CM

## 2018-10-08 DIAGNOSIS — J93.9 PNEUMOTHORAX ON LEFT: Primary | ICD-10-CM

## 2018-10-08 PROCEDURE — 71046 X-RAY EXAM CHEST 2 VIEWS: CPT

## 2018-10-08 RX ORDER — EPINEPHRINE 0.3 MG/.3ML
0.3 INJECTION SUBCUTANEOUS
COMMUNITY

## 2018-10-08 RX ORDER — PSEUDOEPHEDRINE HCL 30 MG
30 TABLET ORAL AS NEEDED
COMMUNITY
End: 2019-08-29 | Stop reason: ALTCHOICE

## 2018-10-08 NOTE — PROGRESS NOTES
Follow up for exploratory VATS on 9/25/18. 1. Have you been to the ER, urgent care clinic since your last visit? Hospitalized since your last visit? Yes, [facility] on 9/24/18 for pneumothorax. 2. Have you seen or consulted any other health care providers outside of the [address] [place] [address] since your last visit? Include any pap smears or colon screening.  No

## 2018-10-08 NOTE — MR AVS SNAPSHOT
[address] [facility] [address] [place] [address] 
[phone] Patient: Wilber Ochoa MRN: [id number] EBW:[birth year] Visit Information Date & Time Provider Department Dept. Phone Encounter #  
 10/8/2018  2:30 PM Sue Hidalgo  [address] [facility] [phone] [id number] Follow-up Instructions Return if symptoms worsen or fail to improve. Upcoming Health Maintenance Date Due  
 HPV Age 9Y-34Y (3 of 1 - Male 3 Dose Series) 2/3/2008 Influenza Age 5 to Adult 8/1/2018 DTaP/Tdap/Td series (7 - Td) 1/14/2019 Allergies as of 10/8/2018  Review Complete On: 10/8/2018 By: Sue Hidalgo NP Severity Noted Reaction Type Reactions Egg High 09/24/2018    Anaphylaxis Eggshell Membrane High 02/02/2017    Swelling Hazelnut  02/02/2017   Topical Other (comments) Current Immunizations  Reviewed on 9/26/2018 Name Date DTaP 4/13/2001, 7/6/2000, 4/13/1998, 1997, 1997 Hep A Vaccine 1/14/2009, 8/3/2006 Hep B Vaccine 1995, 1997, 1997 Hib 7/6/2000, 4/13/1998, 1997, 1997 MMR 4/13/2001, 4/13/1998 Meningococcal (MCV4P) Vaccine 2/6/2013, 1/14/2009 Poliovirus vaccine 4/13/2001, 1997, 1997, 1997 Tdap 1/14/2009 Varicella Virus Vaccine 6/9/2010, 7/10/1998 Not reviewed this visit You Were Diagnosed With   
  
 Codes Comments Pneumothorax on left    -  Primary ICD-10-CM: J93.9 ICD-9-CM: 512.89 Vitals BP Pulse Resp Height(growth percentile) Weight(growth percentile) SpO2  
 110/68 (BP 1 Location: Right arm, BP Patient Position: Sitting) (!) 111 18 6' 2\" (1.88 m) 128 lb (58.1 kg) 97% BMI Smoking Status 16.43 kg/m2 Never Smoker BMI and BSA Data Body Mass Index Body Surface Area  
 16.43 kg/m 2 1.74 m 2 Preferred Pharmacy Pharmacy Name Phone [facility] [address]  [facility] [phone] Your Updated Medication List  
  
   
This list is accurate as of 10/8/18  4:15 PM.  Always use your most recent med list.  
  
  
  
  
 EPINEPHrine 0.3 mg/0.3 mL injection Commonly known as:  EPIPEN  
0.3 mg by IntraMUSCular route once as needed. HYDROcodone-acetaminophen 5-325 mg per tablet Commonly known as:  Tom Dodrill Take 1-2 Tabs by mouth every four (4) hours as needed. Max Daily Amount: 12 Tabs. MUCINEX PO Take 1 Tab by mouth as needed. ondansetron 4 mg disintegrating tablet Commonly known as:  ZOFRAN ODT Take 1 Tab by mouth every eight (8) hours as needed for Nausea. Indications: PREVENTION OF POST-OPERATIVE NAUSEA AND VOMITING  
  
 pseudoephedrine 30 mg tablet Commonly known as:  SUDAFED Take 30 mg by mouth as needed for Congestion. Follow-up Instructions Return if symptoms worsen or fail to improve. Patient Instructions OK to drive and return to work No air travel or scuba diving for 6 weeks Schedule Ophthalmology exam and follow up with a Rheumatologist for Marfan's Syndrome Introducing [facility]! [facility] introduces ComVibe patient portal. Now you can access parts of your medical record, email your doctor's office, and request medication refills online. 1. In your internet browser, go to [email or web address] 2. Click on the First Time User? Click Here link in the Sign In box. You will see the New Member Sign Up page. 3. Enter your ComVibe Access Code exactly as it appears below. You will not need to use this code after youve completed the sign-up process. If you do not sign up before the expiration date, you must request a new code. · ComVibe Access Code: [email or web address] Expires: 12/23/2018  2:31 PM 
 
 4. Enter the last four digits of your Social Security Number (xxxx) and Date of Birth (mm/dd/yyyy) as indicated and click Submit. You will be taken to the next sign-up page. 5. Create a CollegePostings ID. This will be your CollegePostings login ID and cannot be changed, so think of one that is secure and easy to remember. 6. Create a CollegePostings password. You can change your password at any time. 7. Enter your Password Reset Question and Answer. This can be used at a later time if you forget your password. 8. Enter your e-mail address. You will receive e-mail notification when new information is available in [address] 9. Click Sign Up. You can now view and download portions of your medical record. 10. Click the Download Summary menu link to download a portable copy of your medical information. If you have questions, please visit the Frequently Asked Questions section of the CollegePostings website. Remember, [employer]ings is NOT to be used for urgent needs. For medical emergencies, dial 911. Now available from your iPhone and Android! Please provide this summary of care documentation to your next provider. Your primary care clinician is listed as Eugenia Membreno. If you have any questions after today's visit, please call [phone].

## 2018-10-08 NOTE — PATIENT INSTRUCTIONS
OK to drive and return to work  No air travel or scuba diving for 6 weeks  Schedule Ophthalmology exam and follow up with a Rheumatologist for Marfan's Syndrome

## 2018-10-08 NOTE — PROGRESS NOTES
Subjective:      Jerrica Neal is a 26 y.o. male presents for postop care . Procedure: 9/25/18 Exploratory left VATS for PTX    Appetite is good. Eating a regular diet without difficulty. Bowel movements are regular. The patient is voiding without difficulty. The patient is not having any pain. .    Objective:     Visit Vitals    /68 (BP 1 Location: Right arm, BP Patient Position: Sitting)    Pulse (!) 111    Resp 18    Ht 6' 2\" (1.88 m)    Wt 128 lb (58.1 kg)    SpO2 97%    BMI 16.43 kg/m2       Physical Exam:  GENERAL: alert, cooperative, no distress, appears stated age, LUNG: clear to auscultation bilaterally, HEART: regular rate and rhythm, ABDOMEN: soft, non-tender. Bowel sounds normal. No masses,  no organomegaly, EXTREMITIES:  extremities normal, atraumatic, no cyanosis or edema    Data Review images and reports reviewed    Assessment:     Patient Active Problem List   Diagnosis Code    Spontaneous pneumothorax J93.83    Anaphylaxis T78. 2XXA       Doing well postoperatively. Only needed narcotic pain med for two days         Plan/Recommendations/Medical Decision Making:      Jerrica Neal will be followed as needed    Given names of Rheumatologists to follow up with   Opthalmology to check with insurance   OK to drive and return to work  No air travel or scuba diving for 6 weeks  All questions were personally answered. Thank you for allowing us to participate in the care of your patient.     [address]  Thoracic Surgery

## 2018-10-14 NOTE — PROGRESS NOTES
I saw and evaluated pt with resident. I reviewed with the resident the patient's medical history and the resident's history and findings on the physical examination. I discussed assessment and plan with the resident and concur with the findings and plan as documented in the resident's note. 
 
26 y.o. male with PMH of perforated ulcer in 2015 presents with LUQ/rib pain Mom also brings up pectus carinatum, which prompted evaluation for Marfans pt met many physical exam criteria XR chest and abd done which shows spontaneous PTX and no free air under the diaphram 
VS wnl. No dyspnea or respiratory distress. Sent to [address] [place] [address] ER for CT surgery evaluation Needs echo as well to r/o aortic root dilation Sascha Villalba MD

## 2018-10-23 NOTE — PROGRESS NOTES
Nyla Bariros 
26 y.o. male 1995 
[facility] 
[id number] [address]: Progress Note Karuna Jones MD 
  
 
Encounter Date: 10/3/2018 Chief Complaint Patient presents with  
[facility] Follow Up History of Present Illness Nyla Barrios is a 26 y.o. male who presents to clinic today for hospital follow-up. He was admitted to [address] [place] [address] from 9/24-9/27 with a spontaneous pneumothorax. His medical records were reviewed and summarized as follows: he underwent exploratory VATS \"Surgery on 9/25/18, chest tube to suction until 9/26/18. Chest tube removed on 9/27/18. On 9/26/18 he had an Echocardiogram as part of the work up for Marfan's syndrome. His low BMI and body habitus are consistent with Marfan's. \"  He was discharged on room air without any significant dyspnea. Since discharge he continued to do well with minimal dyspnea. He has scheduled follow-up to evaluate his lungs as well as for a possible marfan's diagnosis. Mother also requesting a refill on his EpiPen. Has been >1 year since last filled (actually had Josefa Barajas in med list). Review of Systems Review of Systems Constitutional: Negative for chills, fever and weight loss. Respiratory: Negative for cough, hemoptysis, sputum production, shortness of breath and wheezing. Cardiovascular: Negative for chest pain, palpitations and leg swelling. Gastrointestinal: Negative for abdominal pain. Musculoskeletal:  
     Chest wall pain at site of thoracostomy Skin: Negative for rash. All other systems reviewed and are negative. Vitals/Objective:  
 
Vitals:  
 10/03/18 1521 BP: 119/74 Pulse: (!) 120 Resp: 17 Temp: 98.1 °F (36.7 °C) TempSrc: Oral  
SpO2: 97% Weight: 128 lb 12.8 oz (58.4 kg) Height: 6' 2\" (1.88 m) Body mass index is 16.54 kg/m².  
 
General: Patient alert and oriented and in NAD 
 HEENT: PER/EOMI, no conjunctival pallor or scleral icterus. No thyromegaly or cervical lymphadenopathy Heart: Regular rate and rhythm, No murmurs, rubs or gallops. 2+ peripheral pulses. HR improved to 108. Lungs: Slightly diminished at the bases, but otherwise clear to auscultation bilaterally, no wheezing, rales or rhonchi Abd: +BS, non-tender, non-distended Ext: No edema Skin: No rashes or lesions noted on exposed skin, Psych: Appropriate mood and affect Assessment and Plan: 1. Spontaneous pneumothorax Resolved. Continue evaluation for Marfans. 2. Anaphylaxis, initial encounter Refilled with appropriate dose. - EPINEPHrine (EPIPEN 2-KALIA) 0.3 mg/0.3 mL injection; 0.3 mL by IntraMUSCular route once as needed for up to 1 dose. Dispense: 2 Syringe; Refill: 0 
 
3. Hospital discharge follow-up I have discussed the diagnosis with the patient and the intended plan as seen in the above orders. he has expressed understanding. The patient has received an after-visit summary and questions were answered concerning future plans. I have discussed medication side effects and warnings with the patient as well. Follow-up Disposition: 
Return if symptoms worsen or fail to improve. Electronically Signed: Eugenia Membreno MD 
  
History Patients past medical, surgical and family histories were reviewed and updated. Past Medical History:  
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 No pertinent surgical history. Family History Problem Relation Age of Onset  Anxiety Mother  Depression Mother  Hypertension Father  Sleep Apnea Father  Heart Disease Father  Dementia Maternal Grandmother Social History Socioeconomic History  Marital status: SINGLE Spouse name: Not on file  Number of children: Not on file  Years of education: Not on file  Highest education level: Not on file Social Needs  Financial resource strain: Not on file  Food insecurity - worry: Not on file  Food insecurity - inability: Not on file  Transportation needs - medical: Not on file  Transportation needs - non-medical: Not on file Occupational History  Not on file Tobacco Use  Smoking status: Never Smoker  Smokeless tobacco: Never Used Substance and Sexual Activity  Alcohol use: No  
 Drug use: No  
 Sexual activity: Not on file Other Topics Concern  Not on file Social History Narrative  Not on file Current Medications/Allergies Current Outpatient Medications Medication Sig Dispense Refill  
 HYDROcodone-acetaminophen (NORCO) 5-325 mg per tablet Take 1-2 Tabs by mouth every four (4) hours as needed. Max Daily Amount: 12 Tabs. 30 Tab 0  
 guaifenesin (MUCINEX PO) Take 1 Tab by mouth as needed.  pseudoephedrine (SUDAFED) 30 mg tablet Take 30 mg by mouth as needed for Congestion.  EPINEPHrine (EPIPEN) 0.3 mg/0.3 mL injection 0.3 mg by IntraMUSCular route once as needed.  ondansetron (ZOFRAN ODT) 4 mg disintegrating tablet Take 1 Tab by mouth every eight (8) hours as needed for Nausea. Indications: PREVENTION OF POST-OPERATIVE NAUSEA AND VOMITING 20 Tab 0 Allergies Allergen Reactions  Egg Anaphylaxis  Eggshell Membrane Swelling  Hazelnut Other (comments)

## 2019-01-15 ENCOUNTER — TELEPHONE (OUTPATIENT)
Dept: FAMILY MEDICINE CLINIC | Age: 24
End: 2019-01-15

## 2019-01-15 ENCOUNTER — OFFICE VISIT (OUTPATIENT)
Dept: SURGERY | Age: 24
End: 2019-01-15

## 2019-01-15 ENCOUNTER — HOSPITAL ENCOUNTER (OUTPATIENT)
Dept: GENERAL RADIOLOGY | Age: 24
Discharge: HOME | End: 2019-01-15
Payer: OTHER GOVERNMENT

## 2019-01-15 VITALS
OXYGEN SATURATION: 97 % | HEART RATE: 112 BPM | WEIGHT: 128 LBS | BODY MASS INDEX: 16.43 KG/M2 | SYSTOLIC BLOOD PRESSURE: 128 MMHG | DIASTOLIC BLOOD PRESSURE: 70 MMHG | HEIGHT: 74 IN | RESPIRATION RATE: 18 BRPM

## 2019-01-15 DIAGNOSIS — R06.02 SOB (SHORTNESS OF BREATH): Primary | ICD-10-CM

## 2019-01-15 DIAGNOSIS — Q87.40 MARFAN'S SYNDROME: ICD-10-CM

## 2019-01-15 DIAGNOSIS — J93.83 SPONTANEOUS PNEUMOTHORAX: Primary | ICD-10-CM

## 2019-01-15 DIAGNOSIS — R06.02 SOB (SHORTNESS OF BREATH): ICD-10-CM

## 2019-01-15 DIAGNOSIS — J93.9 PNEUMOTHORAX ON LEFT: Primary | ICD-10-CM

## 2019-01-15 PROCEDURE — 71046 X-RAY EXAM CHEST 2 VIEWS: CPT

## 2019-01-15 RX ORDER — DIPHENHYDRAMINE HCL 25 MG
25 TABLET ORAL AS NEEDED
COMMUNITY

## 2019-01-15 NOTE — TELEPHONE ENCOUNTER
Dr Desai Necessary: This patient is being seen today by Thoracic surgery & he need a referral.... Will you enter an order into [facility] for Thoracic consultation, patient is post op. ... Any questions call me. ...    Thanks    [employer] B

## 2019-01-15 NOTE — PROGRESS NOTES
Patient c/o occasional pain with breathing. 1. Have you been to the ER, urgent care clinic since your last visit? Hospitalized since your last visit? No    2. Have you seen or consulted any other health care providers outside of the [address] [place] [address] since your last visit? Include any pap smears or colon screening.  No

## 2019-01-15 NOTE — PROGRESS NOTES
Subjective:      Samuel Barth is a 26 y.o. male presents for follow up care . He called this morning saying he was having pain with deep inspirations. Procedure: 09/25/2018: Explortary left VATS for PTX. Found to have possible AV malformation left upper chest    Appetite is good. Eating a regular diet without difficulty. Bowel movements are regular. The patient is voiding without difficulty. Pain is controlled without any medications. .    Objective:     Visit Vitals  /70 (BP 1 Location: Right arm, BP Patient Position: Sitting)   Pulse (!) 112   Resp 18   Ht 6' 2\" (1.88 m)   Wt 128 lb (58.1 kg)   SpO2 97%   BMI 16.43 kg/m²       Physical Exam:  GENERAL: alert, cooperative, no distress, appears stated age, LUNG: clear to auscultation bilaterally, HEART: regular rate and rhythm, EXTREMITIES:  extremities normal, atraumatic, no cyanosis or edema    Data Review images and reports reviewed    Assessment:     Patient Active Problem List   Diagnosis Code    Spontaneous pneumothorax J93.83    Anaphylaxis T78. 2XXA       He is doing well. States last week (Friday) he increased the amount and intensity of his upper body work out. On Sunday he noticed discomfort along the lower rib line and with deep inspirations. Hot shower did relieve this for a short while. Reassured him and his mother that the CXR was without any PTX. He has not been able to get in to see a Rheumatologists yet. He has an appointment with Ophthalmology on 1/18/19. Instructed to apply heat the the incision area and take Advil or Aleve for discomfort. Plan/Recommendations/Medical Decision Making:      Samuel Barth will be seen again if needed    All questions were personally answered. Thank you for allowing us to participate in the care of your patient.     Ronel Yi, [address] 36  Thoracic Surgery

## 2019-01-15 NOTE — TELEPHONE ENCOUNTER
Dr Gurinder Maxwell:    I also need an order for Ophthamology. ... Will you please enter an order in [address] [place] [address] for ophthamology consult. ... Call me if you have any questions. ... Thanks. ...     Norah SMALL

## 2019-01-15 NOTE — TELEPHONE ENCOUNTER
----- Message from Dolores Rivas sent at 1/15/2019  7:35 AM EST -----  Regarding: Dr. Camron Diamond  Pt's mother(Ines Calvert) requested a referral to an opthalmologic( at [facility]) for an appt on 01/18/19 at 2:00 p.m.(fax number unavailable). Pt's mother also stated pt need a referral for today to the thoracic doctor(CLAUDY Chen C)[phone])([phone] for an appt at 10:30 a.m. Best contact number [phone].

## 2019-01-16 PROBLEM — Q87.40 MARFAN'S SYNDROME: Status: ACTIVE | Noted: 2019-01-16

## 2019-01-16 NOTE — TELEPHONE ENCOUNTER
Dr Kevon Alberto: The reason for the Ophthamology consult is, the patient has been diagnosed with Marfan's syndrome. ... (Q87.4). ... Any questions call me. ...     Thanks   AVIS B

## 2019-08-21 ENCOUNTER — OFFICE VISIT (OUTPATIENT)
Dept: FAMILY MEDICINE CLINIC | Age: 24
End: 2019-08-21

## 2019-08-21 VITALS
OXYGEN SATURATION: 97 % | BODY MASS INDEX: 16.22 KG/M2 | SYSTOLIC BLOOD PRESSURE: 106 MMHG | DIASTOLIC BLOOD PRESSURE: 65 MMHG | TEMPERATURE: 97.5 F | HEIGHT: 74 IN | HEART RATE: 76 BPM | WEIGHT: 126.4 LBS | RESPIRATION RATE: 18 BRPM

## 2019-08-21 DIAGNOSIS — H61.22 LEFT EAR IMPACTED CERUMEN: ICD-10-CM

## 2019-08-21 DIAGNOSIS — H92.02 LEFT EAR PAIN: Primary | ICD-10-CM

## 2019-08-21 NOTE — PROGRESS NOTES
Chief Complaint   Patient presents with    Ear Pain     left ear pain x 3 days. pain level 0/10    :    HPI  Joy Edgar is a 27 y.o. male who presents for a progressive onset of left ear pain, 3 days ago and stable. the context: swim teacher  Pain  Intensity 8/10 , sharp. Associated symptoms: none. . Patient has tried ear wax cleaning with a  and Debrox. Patient denies fever, chills, discharge, CP/ SOB/ N/V/ diarrhea. Allergies - reviewed: Allergies   Allergen Reactions    Egg Anaphylaxis    Eggshell Membrane Swelling    Hazelnut Other (comments)       Meds - reviewed:   Current Outpatient Medications   Medication Sig Dispense Refill    diphenhydrAMINE (BENADRYL ALLERGY) 25 mg tablet Take 25 mg by mouth as needed (for allergic reaction).  EPINEPHrine (EPIPEN) 0.3 mg/0.3 mL injection 0.3 mg by IntraMUSCular route once as needed.  guaifenesin (MUCINEX PO) Take 1 Tab by mouth as needed.  pseudoephedrine (SUDAFED) 30 mg tablet Take 30 mg by mouth as needed for Congestion.          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     Pectus carinatum        SH- reviewed:  Smoker:  Social History     Tobacco Use   Smoking Status Never Smoker   Smokeless Tobacco Never Used       ETOH- reviewed:   Social History     Substance and Sexual Activity   Alcohol Use No       FH- reviewed:   Family History   Problem Relation Age of Onset    Anxiety Mother     Depression Mother     Hypertension Father     Sleep Apnea Father     Heart Disease Father     Dementia Maternal Grandmother     Diabetes Maternal Grandmother     Cancer Maternal Aunt         colon         ROS: Positive for Items in bold:   Constitutional: headache, fever, fatigue, weight loss or weight gain      Eyes: redness, pruritis, pain, visual changes, swelling, or discharge      Ears: ear pain, loss or changes in hearing     Nose: congestion, rhinorrhea  Oropharynx: sore throat, lesions in throat  Cardiac: chest pain      Resp: cough or shortness of breath          Physical Exam:  Visit Vitals  /65 (BP 1 Location: Right arm, BP Patient Position: Sitting)   Pulse 76   Temp 97.5 °F (36.4 °C) (Oral)   Resp 18   Ht 6' 2\" (1.88 m)   Wt 126 lb 6.4 oz (57.3 kg)   SpO2 97%   BMI 16.23 kg/m²       Gen: No apparent distress. Alert and oriented and responds to all questions appropriately. Eyes: Conjunctiva clear, extraocular movements are intact. Ear: External ears: erythematous on the right side but no tenderness of tragus or pulling on lobule. Right TM normal.   Left external canal is clogged with wax. TM not seen. Tender with tragus palpation. Hearing grossly normal b/l. Nose: Turbinates are within normal limits. No drainage or sinus tenderness . Oropharynx: No oral lesions or exudates. Neck: Supple  Neurologic: No focal neurologic deficits. Coordination and gait grossly intact. Ext: Well perfused. No edema. Skin: No obvious rashes. Assessment and Plan:   José Luis Navas is a 27 y.o. male who presents for left ear pain, likely 2/2 to impacted cerumen. Will disimpact with washing. Patient re-examined after wax removal. Wax is removed. Pain has improved. 50 %. Advised trying peroxide drop 3% ; 1 drop once to prevent otitis externa      ICD-10-CM ICD-9-CM    1. Left ear pain H92.02 388.70    2. Left ear impacted cerumen H61.22 380.4 REMOVE IMPACTED EAR WAX     Discussed diagnoses in detail with patient. Patient expressed understanding of and agreement to above plan. All questions and concerns addressed. Medication risks/benefits/side effects discussed with patient. Patient is counseled to return to the office and/or ED if symptoms do not improve as expected. Patient  discussed with Dr. Tien Marin, Attending Physician. Jose Francisco Perez MD  08/21/19    Family Medicine Resident

## 2019-08-21 NOTE — PROGRESS NOTES
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 I discussed with the resident the patient's diagnosis and concur with the plan.

## 2019-08-29 ENCOUNTER — OFFICE VISIT (OUTPATIENT)
Dept: FAMILY MEDICINE CLINIC | Age: 24
End: 2019-08-29

## 2019-08-29 VITALS
BODY MASS INDEX: 15.92 KG/M2 | RESPIRATION RATE: 16 BRPM | TEMPERATURE: 98.1 F | HEART RATE: 79 BPM | DIASTOLIC BLOOD PRESSURE: 63 MMHG | HEIGHT: 74 IN | SYSTOLIC BLOOD PRESSURE: 95 MMHG | OXYGEN SATURATION: 97 % | WEIGHT: 124 LBS

## 2019-08-29 DIAGNOSIS — R09.82 POST-NASAL DRIP: ICD-10-CM

## 2019-08-29 DIAGNOSIS — J20.9 ACUTE BRONCHITIS, UNSPECIFIED ORGANISM: Primary | ICD-10-CM

## 2019-08-29 DIAGNOSIS — J02.9 SORE THROAT: ICD-10-CM

## 2019-08-29 DIAGNOSIS — R05.9 COUGH: ICD-10-CM

## 2019-08-29 LAB
S PYO AG THROAT QL: NEGATIVE
VALID INTERNAL CONTROL?: YES

## 2019-08-29 RX ORDER — BENZONATATE 200 MG/1
200 CAPSULE ORAL 2 TIMES DAILY
Qty: 14 CAP | Refills: 0 | Status: SHIPPED | OUTPATIENT
Start: 2019-08-29 | End: 2019-09-05

## 2019-08-29 RX ORDER — FLUTICASONE PROPIONATE 50 MCG
2 SPRAY, SUSPENSION (ML) NASAL DAILY
Qty: 1 BOTTLE | Refills: 1 | Status: SHIPPED | OUTPATIENT
Start: 2019-08-29 | End: 2019-09-20

## 2019-08-29 NOTE — PATIENT INSTRUCTIONS
Bronchitis: Care Instructions  Your Care Instructions    Bronchitis is inflammation of the bronchial tubes, which carry air to the lungs. The tubes swell and produce mucus, or phlegm. The mucus and inflamed bronchial tubes make you cough. You may have trouble breathing. Most cases of bronchitis are caused by viruses like those that cause colds. Antibiotics usually do not help and they may be harmful. Bronchitis usually develops rapidly and lasts about 2 to 3 weeks in otherwise healthy people. Follow-up care is a key part of your treatment and safety. Be sure to make and go to all appointments, and call your doctor if you are having problems. It's also a good idea to know your test results and keep a list of the medicines you take. How can you care for yourself at home? · Take all medicines exactly as prescribed. Call your doctor if you think you are having a problem with your medicine. · Get some extra rest.  · Take an over-the-counter pain medicine, such as acetaminophen (Tylenol), ibuprofen (Advil, Motrin), or naproxen (Aleve) to reduce fever and relieve body aches. Read and follow all instructions on the label. · Do not take two or more pain medicines at the same time unless the doctor told you to. Many pain medicines have acetaminophen, which is Tylenol. Too much acetaminophen (Tylenol) can be harmful. · Take an over-the-counter cough medicine that contains dextromethorphan to help quiet a dry, hacking cough so that you can sleep. Avoid cough medicines that have more than one active ingredient. Read and follow all instructions on the label. · Breathe moist air from a humidifier, hot shower, or sink filled with hot water. The heat and moisture will thin mucus so you can cough it out. · Do not smoke. Smoking can make bronchitis worse. If you need help quitting, talk to your doctor about stop-smoking programs and medicines. These can increase your chances of quitting for good.   When should you call for help? Call 911 anytime you think you may need emergency care. For example, call if:    · You have severe trouble breathing.    Call your doctor now or seek immediate medical care if:    · You have new or worse trouble breathing.     · You cough up dark brown or bloody mucus (sputum).     · You have a new or higher fever.     · You have a new rash.    Watch closely for changes in your health, and be sure to contact your doctor if:    · You cough more deeply or more often, especially if you notice more mucus or a change in the color of your mucus.     · You are not getting better as expected. Where can you learn more? Go to http://sandra-ruthann.info/. Enter H333 in the search box to learn more about \"Bronchitis: Care Instructions. \"  Current as of: September 5, 2018  Content Version: 12.1  © 4556-1176 [employer]. Care instructions adapted under license by [employer] (which disclaims liability or warranty for this information). If you have questions about a medical condition or this instruction, always ask your healthcare professional. [address] any warranty or liability for your use of this information.

## 2019-08-29 NOTE — PROGRESS NOTES
Chief Complaint   Patient presents with    Cough     x4 weeks ago    Sore Throat     lasted about a week    Nasal Congestion   :    HPI  Bulmaro Otoole is a 27 y.o. male who presents for a progressive onset of cough, 5 days ago and progressively worsening. the context: cold 5 weeks for 2 weeks that has improved, then symptoms are now coming back. Productive greenish/ white cough, sometimes it is dry. Worse at night and relieved by nothing. Associated symptoms are vomited last night due to cough, sore throat. Patient has tried Robitussin, Mucinex, Honey, salt water. , tylenol ( last dose about 5 days for back pain)  Patient denies fever, chills, CP/ SOB      Allergies - reviewed: Allergies   Allergen Reactions    Egg Anaphylaxis    Eggshell Membrane Swelling    Hazelnut Other (comments)       Meds - reviewed:   Current Outpatient Medications   Medication Sig Dispense Refill    benzonatate (TESSALON) 200 mg capsule Take 1 Cap by mouth two (2) times a day for 7 days. 14 Cap 0    fluticasone propionate (FLONASE) 50 mcg/actuation nasal spray 2 Sprays by Both Nostrils route daily. 1 Bottle 1    diphenhydrAMINE (BENADRYL ALLERGY) 25 mg tablet Take 25 mg by mouth as needed (for allergic reaction).  guaifenesin (MUCINEX PO) Take 1 Tab by mouth as needed.  EPINEPHrine (EPIPEN) 0.3 mg/0.3 mL injection 0.3 mg by IntraMUSCular route once as needed.          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     Pectus carinatum        SH- reviewed:  Smoker:  Social History     Tobacco Use   Smoking Status Never Smoker   Smokeless Tobacco Never Used       ETOH- reviewed:   Social History     Substance and Sexual Activity   Alcohol Use No       FH- reviewed:   Family History   Problem Relation Age of Onset    Anxiety Mother     Depression Mother     Hypertension Father     Sleep Apnea Father     Heart Disease Father     Dementia Maternal Grandmother     Diabetes Maternal Grandmother     Cancer Maternal Aunt         colon         ROS: Positive for Items in bold:   Constitutional: headache, fever, fatigue, weight loss or weight gain      Eyes: redness, pruritis, pain, visual changes, swelling, or discharge      Ears: ear pain, loss or changes in hearing     Nose: congestion, rhinorrhea  Oropharynx: sore throat, lesions in throat  Cardiac: chest pain      Resp: cough or shortness of breath      GI: nausea, vomiting, constipation, diarrhea, blood in stool  : frequency, urgency, dysuria, hematuria   Neuro: dizziness, numbness, tingling  Psych: anxiety, depression, stress     Physical Exam:  Visit Vitals  BP 95/63   Pulse 79   Temp 98.1 °F (36.7 °C) (Oral)   Resp 16   Ht 6' 2\" (1.88 m)   Wt 124 lb (56.2 kg)   SpO2 97%   BMI 15.92 kg/m²       Gen: No apparent distress. Alert and oriented and responds to all questions appropriately. Eyes: Conjunctiva clear, extraocular movements are intact. Ear: External ears are normal. Hearing grossly normal b/l. Tympanic membranes are clear and without effusion. Nose: Turbinates are large. + drainage. No  sinus tenderness . Oropharynx: No oral lesions or exudates. Neck: Supple; no masses; no thyromegaly appreciated.  No cervical LAD  Lungs: Respirations unlabored; clear to auscultation bilaterally  Cardio: Regular, rate, and rhythm without murmurs, gallops or rubs       Lab Results   Component Value Date/Time    Sodium 138 09/24/2018 04:37 PM    Potassium 4.0 09/24/2018 04:37 PM    Chloride 104 09/24/2018 04:37 PM    CO2 29 09/24/2018 04:37 PM    Anion gap 5 09/24/2018 04:37 PM    Glucose 80 09/24/2018 04:37 PM    BUN 14 09/24/2018 04:37 PM    Creatinine 0.96 09/24/2018 04:37 PM    BUN/Creatinine ratio 15 09/24/2018 04:37 PM    GFR est AA >60 09/24/2018 04:37 PM    GFR est non-AA >60 09/24/2018 04:37 PM    Calcium 9.0 09/24/2018 04:37 PM     Recent Results (from the past 12 hour(s))   AMB POC RAPID STREP A    Collection Time: 08/29/19 10:25 AM   Result Value Ref Range    VALID INTERNAL CONTROL POC Yes     Group A Strep Ag Negative Negative       I have personally reviewed the labs results  CXR Results  (Last 48 hours)               08/29/19 1002  XR CHEST PA LAT Final result    Impression:  IMPRESSION: Normal chest.       Narrative:  EXAM: XR CHEST PA LAT       INDICATION: Cough and chest congestion       COMPARISON: 1/15/2019. FINDINGS: PA and lateral radiographs of the chest demonstrate clear lungs. The   cardiac and mediastinal contours and pulmonary vascularity are normal. The bones   and soft tissues are within normal limits. Assessment and Plan:   Mayda Britton is a 27 y.o. male who presents for cough and PND> likely acute viral bronchitis. Recommended to continue Mucinex DM for the chest congestion  and used a face mask. Continue Honey and Tea. Tessalon pearls for cough and Flonase for PND Explained it may take up to 3 weeks for full recovery. ED precaution given. ICD-10-CM ICD-9-CM    1. Acute bronchitis, unspecified organism J20.9 466.0 benzonatate (TESSALON) 200 mg capsule   2. Cough R05 786.2 XR CHEST PA LAT      AMB POC RAPID STREP A      benzonatate (TESSALON) 200 mg capsule   3. Post-nasal drip R09.82 784.91 fluticasone propionate (FLONASE) 50 mcg/actuation nasal spray   4. Sore throat J02.9 462          Discussed diagnoses in detail with patient. Patient expressed understanding of and agreement to above plan. All questions and concerns addressed. Medication risks/benefits/side effects discussed with patient. Patient is counseled to return to the office and/or ED if symptoms do not improve as expected. Patient  discussed with Dr. Diane Gonzalez, Attending Physician. Jose Francisco Perez MD  08/29/19    Family Medicine Resident

## 2019-08-29 NOTE — PROGRESS NOTES
Chief Complaint   Patient presents with    Cough     x4 weeks ago    Sore Throat     lasted about a week    Nasal Congestion     1. Have you been to the ER, urgent care clinic since your last visit? Hospitalized since your last visit? No    2. Have you seen or consulted any other health care providers outside of the [address] [place] [address] since your last visit? Include any pap smears or colon screening.  No    He got better----for about 3 days and then sx came back--sx times 4 days    Chest discomfort with the cough--no otc are working    No trouble breathing/no fever    Hx of collapsed lung--left side 25 % collapsed

## 2019-09-20 ENCOUNTER — APPOINTMENT (OUTPATIENT)
Dept: GENERAL RADIOLOGY | Age: 24
DRG: 164 | End: 2019-09-20
Attending: THORACIC SURGERY (CARDIOTHORACIC VASCULAR SURGERY)
Payer: OTHER GOVERNMENT

## 2019-09-20 ENCOUNTER — ANESTHESIA EVENT (OUTPATIENT)
Dept: SURGERY | Age: 24
DRG: 164 | End: 2019-09-20
Payer: OTHER GOVERNMENT

## 2019-09-20 ENCOUNTER — HOSPITAL ENCOUNTER (INPATIENT)
Age: 24
LOS: 3 days | Discharge: HOME | DRG: 164 | End: 2019-09-23
Attending: EMERGENCY MEDICINE | Admitting: THORACIC SURGERY (CARDIOTHORACIC VASCULAR SURGERY)
Payer: OTHER GOVERNMENT

## 2019-09-20 ENCOUNTER — OFFICE VISIT (OUTPATIENT)
Dept: FAMILY MEDICINE CLINIC | Age: 24
End: 2019-09-20

## 2019-09-20 ENCOUNTER — ANESTHESIA (OUTPATIENT)
Dept: SURGERY | Age: 24
DRG: 164 | End: 2019-09-20
Payer: OTHER GOVERNMENT

## 2019-09-20 VITALS
RESPIRATION RATE: 18 BRPM | OXYGEN SATURATION: 97 % | BODY MASS INDEX: 16.3 KG/M2 | SYSTOLIC BLOOD PRESSURE: 113 MMHG | WEIGHT: 127 LBS | HEART RATE: 79 BPM | HEIGHT: 74 IN | DIASTOLIC BLOOD PRESSURE: 67 MMHG | TEMPERATURE: 98.1 F

## 2019-09-20 DIAGNOSIS — J93.83 SPONTANEOUS PNEUMOTHORAX: Primary | ICD-10-CM

## 2019-09-20 DIAGNOSIS — R07.9 CHEST PAIN, UNSPECIFIED TYPE: Primary | ICD-10-CM

## 2019-09-20 PROBLEM — J93.9 PNEUMOTHORAX, LEFT: Status: ACTIVE | Noted: 2019-09-20

## 2019-09-20 LAB
ABO + RH BLD: NORMAL
ALBUMIN SERPL-MCNC: 4.2 G/DL (ref 3.5–5)
ALBUMIN/GLOB SERPL: 1.1 {RATIO} (ref 1.1–2.2)
ALP SERPL-CCNC: 100 U/L (ref 45–117)
ALT SERPL-CCNC: 22 U/L (ref 12–78)
ANION GAP SERPL CALC-SCNC: 3 MMOL/L (ref 5–15)
AST SERPL-CCNC: 20 U/L (ref 15–37)
BASOPHILS # BLD: 0.1 K/UL (ref 0–0.1)
BASOPHILS NFR BLD: 1 % (ref 0–1)
BILIRUB SERPL-MCNC: 0.4 MG/DL (ref 0.2–1)
BLOOD GROUP ANTIBODIES SERPL: NORMAL
BUN SERPL-MCNC: 12 MG/DL (ref 6–20)
BUN/CREAT SERPL: 13 (ref 12–20)
CALCIUM SERPL-MCNC: 9.7 MG/DL (ref 8.5–10.1)
CHLORIDE SERPL-SCNC: 109 MMOL/L (ref 97–108)
CO2 SERPL-SCNC: 30 MMOL/L (ref 21–32)
COMMENT, HOLDF: NORMAL
CREAT SERPL-MCNC: 0.95 MG/DL (ref 0.7–1.3)
DIFFERENTIAL METHOD BLD: NORMAL
EOSINOPHIL # BLD: 0.1 K/UL (ref 0–0.4)
EOSINOPHIL NFR BLD: 1 % (ref 0–7)
ERYTHROCYTE [DISTWIDTH] IN BLOOD BY AUTOMATED COUNT: 12.2 % (ref 11.5–14.5)
GLOBULIN SER CALC-MCNC: 3.7 G/DL (ref 2–4)
GLUCOSE SERPL-MCNC: 84 MG/DL (ref 65–100)
HCT VFR BLD AUTO: 44.3 % (ref 36.6–50.3)
HGB BLD-MCNC: 14.5 G/DL (ref 12.1–17)
IMM GRANULOCYTES # BLD AUTO: 0 K/UL (ref 0–0.04)
IMM GRANULOCYTES NFR BLD AUTO: 0 % (ref 0–0.5)
LYMPHOCYTES # BLD: 1.8 K/UL (ref 0.8–3.5)
LYMPHOCYTES NFR BLD: 32 % (ref 12–49)
MCH RBC QN AUTO: 30.7 PG (ref 26–34)
MCHC RBC AUTO-ENTMCNC: 32.7 G/DL (ref 30–36.5)
MCV RBC AUTO: 93.9 FL (ref 80–99)
MONOCYTES # BLD: 0.4 K/UL (ref 0–1)
MONOCYTES NFR BLD: 7 % (ref 5–13)
NEUTS SEG # BLD: 3.2 K/UL (ref 1.8–8)
NEUTS SEG NFR BLD: 59 % (ref 32–75)
NRBC # BLD: 0 K/UL (ref 0–0.01)
NRBC BLD-RTO: 0 PER 100 WBC
PLATELET # BLD AUTO: 229 K/UL (ref 150–400)
PMV BLD AUTO: 10.6 FL (ref 8.9–12.9)
POTASSIUM SERPL-SCNC: 4 MMOL/L (ref 3.5–5.1)
PROT SERPL-MCNC: 7.9 G/DL (ref 6.4–8.2)
RBC # BLD AUTO: 4.72 M/UL (ref 4.1–5.7)
SAMPLES BEING HELD,HOLD: NORMAL
SODIUM SERPL-SCNC: 142 MMOL/L (ref 136–145)
SPECIMEN EXP DATE BLD: NORMAL
WBC # BLD AUTO: 5.5 K/UL (ref 4.1–11.1)

## 2019-09-20 PROCEDURE — 77030031139 HC SUT VCRL2 J&J -A: Performed by: THORACIC SURGERY (CARDIOTHORACIC VASCULAR SURGERY)

## 2019-09-20 PROCEDURE — 85025 COMPLETE CBC W/AUTO DIFF WBC: CPT

## 2019-09-20 PROCEDURE — 77030018836 HC SOL IRR NACL ICUM -A: Performed by: THORACIC SURGERY (CARDIOTHORACIC VASCULAR SURGERY)

## 2019-09-20 PROCEDURE — 76010000153 HC OR TIME 1.5 TO 2 HR: Performed by: THORACIC SURGERY (CARDIOTHORACIC VASCULAR SURGERY)

## 2019-09-20 PROCEDURE — 77030040361 HC SLV COMPR DVT MDII -B: Performed by: THORACIC SURGERY (CARDIOTHORACIC VASCULAR SURGERY)

## 2019-09-20 PROCEDURE — 77030033162 HC PCH SPEC RTVR ENDOSC AMR -B: Performed by: THORACIC SURGERY (CARDIOTHORACIC VASCULAR SURGERY)

## 2019-09-20 PROCEDURE — 80053 COMPREHEN METABOLIC PANEL: CPT

## 2019-09-20 PROCEDURE — 71045 X-RAY EXAM CHEST 1 VIEW: CPT

## 2019-09-20 PROCEDURE — 74011000250 HC RX REV CODE- 250: Performed by: THORACIC SURGERY (CARDIOTHORACIC VASCULAR SURGERY)

## 2019-09-20 PROCEDURE — 99284 EMERGENCY DEPT VISIT MOD MDM: CPT

## 2019-09-20 PROCEDURE — 77030020829: Performed by: THORACIC SURGERY (CARDIOTHORACIC VASCULAR SURGERY)

## 2019-09-20 PROCEDURE — 77030012770 HC TRCR OPT FX AMR -B: Performed by: THORACIC SURGERY (CARDIOTHORACIC VASCULAR SURGERY)

## 2019-09-20 PROCEDURE — 74011250636 HC RX REV CODE- 250/636: Performed by: EMERGENCY MEDICINE

## 2019-09-20 PROCEDURE — 77030008606 HC TRCR ENDOSC KII AMR -B: Performed by: THORACIC SURGERY (CARDIOTHORACIC VASCULAR SURGERY)

## 2019-09-20 PROCEDURE — 36415 COLL VENOUS BLD VENIPUNCTURE: CPT

## 2019-09-20 PROCEDURE — 88307 TISSUE EXAM BY PATHOLOGIST: CPT

## 2019-09-20 PROCEDURE — C1729 CATH, DRAINAGE: HCPCS | Performed by: THORACIC SURGERY (CARDIOTHORACIC VASCULAR SURGERY)

## 2019-09-20 PROCEDURE — 77030018673: Performed by: THORACIC SURGERY (CARDIOTHORACIC VASCULAR SURGERY)

## 2019-09-20 PROCEDURE — 76210000017 HC OR PH I REC 1.5 TO 2 HR: Performed by: THORACIC SURGERY (CARDIOTHORACIC VASCULAR SURGERY)

## 2019-09-20 PROCEDURE — 76060000034 HC ANESTHESIA 1.5 TO 2 HR: Performed by: THORACIC SURGERY (CARDIOTHORACIC VASCULAR SURGERY)

## 2019-09-20 PROCEDURE — 74011250636 HC RX REV CODE- 250/636: Performed by: NURSE ANESTHETIST, CERTIFIED REGISTERED

## 2019-09-20 PROCEDURE — 77030009965 HC RELD STPLR ENDOS COVD -D: Performed by: THORACIC SURGERY (CARDIOTHORACIC VASCULAR SURGERY)

## 2019-09-20 PROCEDURE — 74011000250 HC RX REV CODE- 250: Performed by: NURSE ANESTHETIST, CERTIFIED REGISTERED

## 2019-09-20 PROCEDURE — 74011250636 HC RX REV CODE- 250/636: Performed by: THORACIC SURGERY (CARDIOTHORACIC VASCULAR SURGERY)

## 2019-09-20 PROCEDURE — 77030011640 HC PAD GRND REM COVD -A: Performed by: THORACIC SURGERY (CARDIOTHORACIC VASCULAR SURGERY)

## 2019-09-20 PROCEDURE — 77030018846 HC SOL IRR STRL H20 ICUM -A: Performed by: THORACIC SURGERY (CARDIOTHORACIC VASCULAR SURGERY)

## 2019-09-20 PROCEDURE — 77030011264 HC ELECTRD BLD EXT COVD -A: Performed by: THORACIC SURGERY (CARDIOTHORACIC VASCULAR SURGERY)

## 2019-09-20 PROCEDURE — 77030022473 HC HNDL ENDO GIA UNIV USDA -C: Performed by: THORACIC SURGERY (CARDIOTHORACIC VASCULAR SURGERY)

## 2019-09-20 PROCEDURE — 77030020782 HC GWN BAIR PAWS FLX 3M -B

## 2019-09-20 PROCEDURE — 77030027138 HC INCENT SPIROMETER -A

## 2019-09-20 PROCEDURE — 77030018684: Performed by: THORACIC SURGERY (CARDIOTHORACIC VASCULAR SURGERY)

## 2019-09-20 PROCEDURE — 77030022474 HC RELD STPLR ENDO GIA COVD -C: Performed by: THORACIC SURGERY (CARDIOTHORACIC VASCULAR SURGERY)

## 2019-09-20 PROCEDURE — 86900 BLOOD TYPING SEROLOGIC ABO: CPT

## 2019-09-20 PROCEDURE — 0BTG4ZZ RESECTION OF LEFT UPPER LUNG LOBE, PERCUTANEOUS ENDOSCOPIC APPROACH: ICD-10-PCS | Performed by: THORACIC SURGERY (CARDIOTHORACIC VASCULAR SURGERY)

## 2019-09-20 PROCEDURE — 77030012390 HC DRN CHST BTL GTNG -B: Performed by: THORACIC SURGERY (CARDIOTHORACIC VASCULAR SURGERY)

## 2019-09-20 PROCEDURE — 77030013079 HC BLNKT BAIR HGGR 3M -A: Performed by: ANESTHESIOLOGY

## 2019-09-20 PROCEDURE — 77030008671 HC TU ENDO/BRNC CUF COVD -B: Performed by: ANESTHESIOLOGY

## 2019-09-20 PROCEDURE — 65660000000 HC RM CCU STEPDOWN

## 2019-09-20 PROCEDURE — 77030039266 HC ADH SKN EXOFIN S2SG -A: Performed by: THORACIC SURGERY (CARDIOTHORACIC VASCULAR SURGERY)

## 2019-09-20 PROCEDURE — 74011250636 HC RX REV CODE- 250/636: Performed by: ANESTHESIOLOGY

## 2019-09-20 PROCEDURE — 77030003666 HC NDL SPINAL BD -A: Performed by: THORACIC SURGERY (CARDIOTHORACIC VASCULAR SURGERY)

## 2019-09-20 PROCEDURE — 0B5P4ZZ DESTRUCTION OF LEFT PLEURA, PERCUTANEOUS ENDOSCOPIC APPROACH: ICD-10-PCS | Performed by: THORACIC SURGERY (CARDIOTHORACIC VASCULAR SURGERY)

## 2019-09-20 RX ORDER — SODIUM CHLORIDE 9 MG/ML
25 INJECTION, SOLUTION INTRAVENOUS CONTINUOUS
Status: DISCONTINUED | OUTPATIENT
Start: 2019-09-20 | End: 2019-09-20 | Stop reason: HOSPADM

## 2019-09-20 RX ORDER — MIDAZOLAM HYDROCHLORIDE 1 MG/ML
INJECTION, SOLUTION INTRAMUSCULAR; INTRAVENOUS AS NEEDED
Status: DISCONTINUED | OUTPATIENT
Start: 2019-09-20 | End: 2019-09-20 | Stop reason: HOSPADM

## 2019-09-20 RX ORDER — ONDANSETRON 2 MG/ML
INJECTION INTRAMUSCULAR; INTRAVENOUS AS NEEDED
Status: DISCONTINUED | OUTPATIENT
Start: 2019-09-20 | End: 2019-09-20 | Stop reason: HOSPADM

## 2019-09-20 RX ORDER — SUCCINYLCHOLINE CHLORIDE 20 MG/ML
INJECTION INTRAMUSCULAR; INTRAVENOUS AS NEEDED
Status: DISCONTINUED | OUTPATIENT
Start: 2019-09-20 | End: 2019-09-20 | Stop reason: HOSPADM

## 2019-09-20 RX ORDER — SODIUM CHLORIDE 0.9 % (FLUSH) 0.9 %
5-40 SYRINGE (ML) INJECTION AS NEEDED
Status: DISCONTINUED | OUTPATIENT
Start: 2019-09-20 | End: 2019-09-20 | Stop reason: HOSPADM

## 2019-09-20 RX ORDER — SODIUM CHLORIDE, SODIUM LACTATE, POTASSIUM CHLORIDE, CALCIUM CHLORIDE 600; 310; 30; 20 MG/100ML; MG/100ML; MG/100ML; MG/100ML
100 INJECTION, SOLUTION INTRAVENOUS CONTINUOUS
Status: DISCONTINUED | OUTPATIENT
Start: 2019-09-20 | End: 2019-09-20 | Stop reason: HOSPADM

## 2019-09-20 RX ORDER — LIDOCAINE HYDROCHLORIDE 10 MG/ML
0.1 INJECTION, SOLUTION EPIDURAL; INFILTRATION; INTRACAUDAL; PERINEURAL AS NEEDED
Status: DISCONTINUED | OUTPATIENT
Start: 2019-09-20 | End: 2019-09-20 | Stop reason: HOSPADM

## 2019-09-20 RX ORDER — KETOROLAC TROMETHAMINE 30 MG/ML
30 INJECTION, SOLUTION INTRAMUSCULAR; INTRAVENOUS EVERY 6 HOURS
Status: COMPLETED | OUTPATIENT
Start: 2019-09-20 | End: 2019-09-23

## 2019-09-20 RX ORDER — LANOLIN ALCOHOL/MO/W.PET/CERES
400 CREAM (GRAM) TOPICAL DAILY
COMMUNITY

## 2019-09-20 RX ORDER — DEXAMETHASONE SODIUM PHOSPHATE 4 MG/ML
INJECTION, SOLUTION INTRA-ARTICULAR; INTRALESIONAL; INTRAMUSCULAR; INTRAVENOUS; SOFT TISSUE AS NEEDED
Status: DISCONTINUED | OUTPATIENT
Start: 2019-09-20 | End: 2019-09-20 | Stop reason: HOSPADM

## 2019-09-20 RX ORDER — MIDAZOLAM HYDROCHLORIDE 1 MG/ML
1 INJECTION, SOLUTION INTRAMUSCULAR; INTRAVENOUS AS NEEDED
Status: DISCONTINUED | OUTPATIENT
Start: 2019-09-20 | End: 2019-09-20 | Stop reason: HOSPADM

## 2019-09-20 RX ORDER — SODIUM CHLORIDE 0.9 % (FLUSH) 0.9 %
5-40 SYRINGE (ML) INJECTION EVERY 8 HOURS
Status: DISCONTINUED | OUTPATIENT
Start: 2019-09-20 | End: 2019-09-20 | Stop reason: HOSPADM

## 2019-09-20 RX ORDER — ACETAMINOPHEN 325 MG/1
650 TABLET ORAL ONCE
Status: DISCONTINUED | OUTPATIENT
Start: 2019-09-20 | End: 2019-09-20 | Stop reason: HOSPADM

## 2019-09-20 RX ORDER — SODIUM CHLORIDE 0.9 % (FLUSH) 0.9 %
5-40 SYRINGE (ML) INJECTION EVERY 8 HOURS
Status: DISCONTINUED | OUTPATIENT
Start: 2019-09-20 | End: 2019-09-23 | Stop reason: HOSPADM

## 2019-09-20 RX ORDER — LIDOCAINE HYDROCHLORIDE 20 MG/ML
INJECTION, SOLUTION EPIDURAL; INFILTRATION; INTRACAUDAL; PERINEURAL AS NEEDED
Status: DISCONTINUED | OUTPATIENT
Start: 2019-09-20 | End: 2019-09-20 | Stop reason: HOSPADM

## 2019-09-20 RX ORDER — FENTANYL CITRATE 50 UG/ML
25 INJECTION, SOLUTION INTRAMUSCULAR; INTRAVENOUS
Status: DISCONTINUED | OUTPATIENT
Start: 2019-09-20 | End: 2019-09-20 | Stop reason: HOSPADM

## 2019-09-20 RX ORDER — ROPIVACAINE HYDROCHLORIDE 5 MG/ML
30 INJECTION, SOLUTION EPIDURAL; INFILTRATION; PERINEURAL AS NEEDED
Status: DISCONTINUED | OUTPATIENT
Start: 2019-09-20 | End: 2019-09-20 | Stop reason: HOSPADM

## 2019-09-20 RX ORDER — HYDROMORPHONE HYDROCHLORIDE 2 MG/1
2 TABLET ORAL
Status: DISCONTINUED | OUTPATIENT
Start: 2019-09-20 | End: 2019-09-23 | Stop reason: HOSPADM

## 2019-09-20 RX ORDER — ONDANSETRON 2 MG/ML
4 INJECTION INTRAMUSCULAR; INTRAVENOUS AS NEEDED
Status: DISCONTINUED | OUTPATIENT
Start: 2019-09-20 | End: 2019-09-20 | Stop reason: HOSPADM

## 2019-09-20 RX ORDER — ONDANSETRON 2 MG/ML
4 INJECTION INTRAMUSCULAR; INTRAVENOUS
Status: DISCONTINUED | OUTPATIENT
Start: 2019-09-20 | End: 2019-09-23 | Stop reason: HOSPADM

## 2019-09-20 RX ORDER — ROCURONIUM BROMIDE 10 MG/ML
INJECTION, SOLUTION INTRAVENOUS AS NEEDED
Status: DISCONTINUED | OUTPATIENT
Start: 2019-09-20 | End: 2019-09-20 | Stop reason: HOSPADM

## 2019-09-20 RX ORDER — DIPHENHYDRAMINE HYDROCHLORIDE 50 MG/ML
12.5 INJECTION, SOLUTION INTRAMUSCULAR; INTRAVENOUS AS NEEDED
Status: DISCONTINUED | OUTPATIENT
Start: 2019-09-20 | End: 2019-09-20 | Stop reason: HOSPADM

## 2019-09-20 RX ORDER — DEXMEDETOMIDINE HYDROCHLORIDE 100 UG/ML
INJECTION, SOLUTION INTRAVENOUS AS NEEDED
Status: DISCONTINUED | OUTPATIENT
Start: 2019-09-20 | End: 2019-09-20 | Stop reason: HOSPADM

## 2019-09-20 RX ORDER — FENTANYL CITRATE 50 UG/ML
50 INJECTION, SOLUTION INTRAMUSCULAR; INTRAVENOUS AS NEEDED
Status: DISCONTINUED | OUTPATIENT
Start: 2019-09-20 | End: 2019-09-20 | Stop reason: HOSPADM

## 2019-09-20 RX ORDER — SODIUM CHLORIDE, SODIUM LACTATE, POTASSIUM CHLORIDE, CALCIUM CHLORIDE 600; 310; 30; 20 MG/100ML; MG/100ML; MG/100ML; MG/100ML
INJECTION, SOLUTION INTRAVENOUS
Status: DISCONTINUED | OUTPATIENT
Start: 2019-09-20 | End: 2019-09-20 | Stop reason: HOSPADM

## 2019-09-20 RX ORDER — FENTANYL CITRATE 50 UG/ML
INJECTION, SOLUTION INTRAMUSCULAR; INTRAVENOUS AS NEEDED
Status: DISCONTINUED | OUTPATIENT
Start: 2019-09-20 | End: 2019-09-20 | Stop reason: HOSPADM

## 2019-09-20 RX ORDER — ETOMIDATE 2 MG/ML
INJECTION INTRAVENOUS AS NEEDED
Status: DISCONTINUED | OUTPATIENT
Start: 2019-09-20 | End: 2019-09-20 | Stop reason: HOSPADM

## 2019-09-20 RX ORDER — NALOXONE HYDROCHLORIDE 0.4 MG/ML
0.4 INJECTION, SOLUTION INTRAMUSCULAR; INTRAVENOUS; SUBCUTANEOUS AS NEEDED
Status: DISCONTINUED | OUTPATIENT
Start: 2019-09-20 | End: 2019-09-23 | Stop reason: HOSPADM

## 2019-09-20 RX ORDER — MIDAZOLAM HYDROCHLORIDE 1 MG/ML
0.5 INJECTION, SOLUTION INTRAMUSCULAR; INTRAVENOUS
Status: DISCONTINUED | OUTPATIENT
Start: 2019-09-20 | End: 2019-09-20 | Stop reason: HOSPADM

## 2019-09-20 RX ORDER — SODIUM CHLORIDE 0.9 % (FLUSH) 0.9 %
5-40 SYRINGE (ML) INJECTION AS NEEDED
Status: DISCONTINUED | OUTPATIENT
Start: 2019-09-20 | End: 2019-09-23 | Stop reason: HOSPADM

## 2019-09-20 RX ORDER — HYDROMORPHONE HYDROCHLORIDE 1 MG/ML
0.5 INJECTION, SOLUTION INTRAMUSCULAR; INTRAVENOUS; SUBCUTANEOUS
Status: DISCONTINUED | OUTPATIENT
Start: 2019-09-20 | End: 2019-09-20 | Stop reason: HOSPADM

## 2019-09-20 RX ORDER — HYDROMORPHONE HYDROCHLORIDE 1 MG/ML
1 INJECTION, SOLUTION INTRAMUSCULAR; INTRAVENOUS; SUBCUTANEOUS
Status: DISCONTINUED | OUTPATIENT
Start: 2019-09-20 | End: 2019-09-23 | Stop reason: HOSPADM

## 2019-09-20 RX ORDER — CEFAZOLIN SODIUM/WATER 2 G/20 ML
2 SYRINGE (ML) INTRAVENOUS ONCE
Status: COMPLETED | OUTPATIENT
Start: 2019-09-20 | End: 2019-09-20

## 2019-09-20 RX ORDER — MORPHINE SULFATE 10 MG/ML
2 INJECTION, SOLUTION INTRAMUSCULAR; INTRAVENOUS
Status: DISCONTINUED | OUTPATIENT
Start: 2019-09-20 | End: 2019-09-20 | Stop reason: HOSPADM

## 2019-09-20 RX ADMIN — ONDANSETRON HYDROCHLORIDE 4 MG: 2 INJECTION, SOLUTION INTRAMUSCULAR; INTRAVENOUS at 15:23

## 2019-09-20 RX ADMIN — LIDOCAINE HYDROCHLORIDE 60 MG: 20 INJECTION, SOLUTION EPIDURAL; INFILTRATION; INTRACAUDAL; PERINEURAL at 14:30

## 2019-09-20 RX ADMIN — SODIUM CHLORIDE 1000 ML: 900 INJECTION, SOLUTION INTRAVENOUS at 12:49

## 2019-09-20 RX ADMIN — SODIUM CHLORIDE, SODIUM LACTATE, POTASSIUM CHLORIDE, AND CALCIUM CHLORIDE 100 ML/HR: 600; 310; 30; 20 INJECTION, SOLUTION INTRAVENOUS at 14:17

## 2019-09-20 RX ADMIN — FENTANYL CITRATE 25 MCG: 50 INJECTION INTRAMUSCULAR; INTRAVENOUS at 16:54

## 2019-09-20 RX ADMIN — FENTANYL CITRATE 50 MCG: 50 INJECTION, SOLUTION INTRAMUSCULAR; INTRAVENOUS at 14:30

## 2019-09-20 RX ADMIN — DEXAMETHASONE SODIUM PHOSPHATE 4 MG: 4 INJECTION, SOLUTION INTRAMUSCULAR; INTRAVENOUS at 14:52

## 2019-09-20 RX ADMIN — ETOMIDATE 10 MG: 2 INJECTION INTRAVENOUS at 14:30

## 2019-09-20 RX ADMIN — FENTANYL CITRATE 50 MCG: 50 INJECTION, SOLUTION INTRAMUSCULAR; INTRAVENOUS at 14:54

## 2019-09-20 RX ADMIN — SUCCINYLCHOLINE CHLORIDE 140 MG: 20 INJECTION, SOLUTION INTRAMUSCULAR; INTRAVENOUS at 14:30

## 2019-09-20 RX ADMIN — Medication 2 G: at 14:40

## 2019-09-20 RX ADMIN — DEXMEDETOMIDINE HYDROCHLORIDE 8 MCG: 100 INJECTION, SOLUTION, CONCENTRATE INTRAVENOUS at 15:24

## 2019-09-20 RX ADMIN — SUGAMMADEX 200 MG: 100 INJECTION, SOLUTION INTRAVENOUS at 14:48

## 2019-09-20 RX ADMIN — ROCURONIUM BROMIDE 25 MG: 10 SOLUTION INTRAVENOUS at 14:36

## 2019-09-20 RX ADMIN — ROCURONIUM BROMIDE 5 MG: 10 SOLUTION INTRAVENOUS at 14:30

## 2019-09-20 RX ADMIN — SODIUM CHLORIDE, POTASSIUM CHLORIDE, SODIUM LACTATE AND CALCIUM CHLORIDE: 600; 310; 30; 20 INJECTION, SOLUTION INTRAVENOUS at 14:20

## 2019-09-20 RX ADMIN — FENTANYL CITRATE 25 MCG: 50 INJECTION INTRAMUSCULAR; INTRAVENOUS at 16:20

## 2019-09-20 RX ADMIN — KETOROLAC TROMETHAMINE 30 MG: 30 INJECTION, SOLUTION INTRAMUSCULAR at 18:31

## 2019-09-20 RX ADMIN — FENTANYL CITRATE 50 MCG: 50 INJECTION, SOLUTION INTRAMUSCULAR; INTRAVENOUS at 14:50

## 2019-09-20 RX ADMIN — Medication 10 ML: at 19:35

## 2019-09-20 RX ADMIN — MIDAZOLAM 3 MG: 1 INJECTION INTRAMUSCULAR; INTRAVENOUS at 14:17

## 2019-09-20 RX ADMIN — Medication 10 ML: at 18:31

## 2019-09-20 RX ADMIN — SODIUM CHLORIDE, SODIUM LACTATE, POTASSIUM CHLORIDE, AND CALCIUM CHLORIDE 100 ML/HR: 600; 310; 30; 20 INJECTION, SOLUTION INTRAVENOUS at 14:10

## 2019-09-20 RX ADMIN — ONDANSETRON 4 MG: 2 INJECTION INTRAMUSCULAR; INTRAVENOUS at 19:33

## 2019-09-20 RX ADMIN — DEXMEDETOMIDINE HYDROCHLORIDE 4 MCG: 100 INJECTION, SOLUTION, CONCENTRATE INTRAVENOUS at 14:56

## 2019-09-20 RX ADMIN — MEPERIDINE HYDROCHLORIDE 25 MG: 50 INJECTION INTRAMUSCULAR; INTRAVENOUS; SUBCUTANEOUS at 15:38

## 2019-09-20 NOTE — ROUTINE PROCESS
TRANSFER - IN REPORT:    Verbal report received from Dorothy Raymond RN(name) on Romayne Bloch  being received from ED(unit) for ordered procedure      Report consisted of patients Situation, Background, Assessment and   Recommendations(SBAR). Information from the following report(s) ED Summary was reviewed with the receiving nurse. Opportunity for questions and clarification was provided. Assessment completed upon patients arrival to unit and care assumed.

## 2019-09-20 NOTE — PROGRESS NOTES
Admission Medication Reconciliation:    Information obtained from:  patient, chart review, insurance claim information  RxQuery data available¹:  YES    Comments/Recommendations: All medications/allergies have been reviewed and updated; last medication administration times reviewed and recorded. The patient was a good historian and could recall medications. He reports he has not taken any medication today prior to presenting to the ED. Allergies to tree nuts, cipro, and Percocet have been added. Changes made to Prior to Admission (PTA) Medication List:   ?   Medications Added:   - Magnesium  ? Medications Changed:   - None   ? Medications Removed:   - Flonase nasal spray, Mucinex PO     ¹RxQuery pharmacy benefit data reflects medications filled and processed through the patient's insurance, however   this data does NOT capture whether the medication was picked up or is currently being taken by the patient. 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 Taking? EPINEPHrine (EPIPEN) 0.3 mg/0.3 mL injection   Yes No   Si.3 mg by IntraMUSCular route once as needed. diphenhydrAMINE (BENADRYL ALLERGY) 25 mg tablet   Yes No   Sig: Take 25 mg by mouth as needed (for allergic reaction). magnesium oxide (MAG-OX) 400 mg tablet 2019 at Unknown time  Yes Yes   Sig: Take 400 mg by mouth daily. Facility-Administered Medications: None           Thank you for allowing pharmacy to participate in the coordination of this patient's care.   If you have any other questions, please contact the medication reconciliation pharmacist at [phone]. Gemma Connors PharmJacy D., BCPS

## 2019-09-20 NOTE — BRIEF OP NOTE
BRIEF OPERATIVE NOTE    Date of Procedure: 9/20/2019   Preoperative Diagnosis: recurrent spontaneous pneumothorax  Postoperative Diagnosis: same  Procedure(s):  THORACOSCOPY VIDEO ASSISTED--APICAL WEDGE RESECTION, AND MECHANICAL PLEURODESIS  Surgeon(s) and Role:     Ezekiel Muñoz MD - Primary         Surgical Assistant: none    Surgical Staff:  Circ-1: Kaye Banks RN  Circ-Relief: Igor Mills  Physician Assistant: LUIS A Shelton  Scrub RN-1: Seth Louise RN  Scrub RN-Relief: Renetta Mackey RN  Float Staff: Timothy Fermin  Event Time In Time Out   Incision Start 1450    Incision Close       Anesthesia: General   Estimated Blood Loss: minimal  Specimens:   ID Type Source Tests Collected by Time Destination   1 : Left Upper Lobe Wedge #1 Fresh Lung, Upper Lobe  Kalpana Dempsey MD 9/20/2019 1500 Pathology      Findings: small apical blebs noted   Complications: none  Implants: * No implants in log *    Condition: stable    Disposition: to pacu

## 2019-09-20 NOTE — H&P
History & Physical      Admit Date: 9/20/2019  Reason for Admission: Recurrent Left Pneumothorax     HPI:  Marcus Hauser is a 27 y.o. male well-known to our service with PMH as noted below who presents to ED this morning with left-sided chest pain x2 days & recurrent Left pneumothorax. He has been working as a  & doing relatively well since we saw him last year for a left PTX. On Wednesday evening (2 days ago) he experienced left sided mid chest pain. The next morning, the left-sided chest pressure increased, without shortness of breath and increased last night while reaching to make his bed. Today the left-sided chest pain persisted, so he went to his PCP office and a CXR was performed (see findings below). He was advised to present to [facility] ED.     CXR (9/20/2019) shows:  IMPRESSION:  1. Interval development of a small left apical pneumothorax. Please see above  text.          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 No pertinent surgical history. Social History           Tobacco Use    Smoking status: Never Smoker    Smokeless tobacco: Never Used   Substance Use Topics    Alcohol use: No            Family History   Problem Relation Age of Onset    Anxiety Mother      Depression Mother      Hypertension Father      Sleep Apnea Father      Heart Disease Father      Dementia Maternal Grandmother      Diabetes Maternal Grandmother      Cancer Maternal Aunt           colon              Prior to Admission medications    Medication Sig Start Date End Date Taking?  Authorizing Provider   magnesium oxide (MAG-OX) 400 mg tablet Take 400 mg by mouth daily.     Yes Provider, Historical   diphenhydrAMINE (BENADRYL ALLERGY) 25 mg tablet Take 25 mg by mouth as needed (for allergic reaction).       Provider, Historical   EPINEPHrine (EPIPEN) 0.3 mg/0.3 mL injection 0.3 mg by IntraMUSCular route once as needed.       Provider, Historical           Allergies   Allergen Reactions    Egg Anaphylaxis    Eggshell Membrane Swelling    Tree Nut Anaphylaxis    Ciprofloxacin Nausea and Vomiting    Hazelnut Other (comments)    Percocet [Oxycodone-Acetaminophen] Nausea and Vomiting            Subjective:      Review of Systems:    A comprehensive review of systems was negative except for that written in the History of Present Illness.         Objective:      Blood pressure 108/60, pulse 81, temperature 97.7 °F (36.5 °C), resp. rate 14, SpO2 99 %. Recent Results          Recent Results (from the past 24 hour(s))   CBC WITH AUTOMATED DIFF     Collection Time: 09/20/19 12:44 PM   Result Value Ref Range     WBC 5.5 4.1 - 11.1 K/uL     RBC 4.72 4.10 - 5.70 M/uL     HGB 14.5 12.1 - 17.0 g/dL     HCT 44.3 36.6 - 50.3 %     MCV 93.9 80.0 - 99.0 FL     MCH 30.7 26.0 - 34.0 PG     MCHC 32.7 30.0 - 36.5 g/dL     RDW 12.2 11.5 - 14.5 %     PLATELET 908 790 - 231 K/uL     MPV 10.6 8.9 - 12.9 FL     NRBC 0.0 0  WBC     ABSOLUTE NRBC 0.00 0.00 - 0.01 K/uL     NEUTROPHILS 59 32 - 75 %     LYMPHOCYTES 32 12 - 49 %     MONOCYTES 7 5 - 13 %     EOSINOPHILS 1 0 - 7 %     BASOPHILS 1 0 - 1 %     IMMATURE GRANULOCYTES 0 0.0 - 0.5 %     ABS. NEUTROPHILS 3.2 1.8 - 8.0 K/UL     ABS. LYMPHOCYTES 1.8 0.8 - 3.5 K/UL     ABS. MONOCYTES 0.4 0.0 - 1.0 K/UL     ABS. EOSINOPHILS 0.1 0.0 - 0.4 K/UL     ABS. BASOPHILS 0.1 0.0 - 0.1 K/UL     ABS. IMM. GRANS. 0.0 0.00 - 0.04 K/UL     DF AUTOMATED     SAMPLES BEING HELD     Collection Time: 09/20/19 12:44 PM   Result Value Ref Range     SAMPLES BEING HELD 1RED,1BLU        COMMENT           Add-on orders for these samples will be processed based on acceptable specimen integrity and analyte stability, which may vary by analyte.    _____________________  Physical Exam:      General:  Alert, cooperative, no distress, appears stated age. Thin. Eyes:   Sclera clear. Throat: Lips, mucosa, and tongue normal.   Neck: Supple, symmetrical, trachea midline. Lungs:   Clear to auscultation bilaterally. No resp distress or intracostal retraction. Heart:  RRR   Abdomen:   Soft, non-tender. Extremities: Extremities thin, atraumatic, no cyanosis or edema. Skin: Skin color, texture normal. No rashes or lesions.               Assessment:   Active Problems:    Pneumothorax, left (9/20/2019)           Plan:      Admit to Dr. Beatriz Jacobs, Thoracic Surgery service  To OR this afternoon for Left VATS, Apical wedge resection & Mechanical Pleurodesis   NPO  Consent signed  Pre-op labs & antibiotics as ordered.       Signed By: LUIS A Byrnes     September 20, 2019

## 2019-09-20 NOTE — ROUTINE PROCESS
TRANSFER - IN REPORT:    Verbal report received from Alison Mora (name) on Almaz Pair  being received from PACU (unit) for routine post - op      Report consisted of patients Situation, Background, Assessment and   Recommendations(SBAR). Information from the following report(s) SBAR, Kardex, OR Summary, Intake/Output, MAR and Cardiac Rhythm HR 70-90 was reviewed with the receiving nurse. Opportunity for questions and clarification was provided. Assessment completed upon patients arrival to unit and care assumed.

## 2019-09-20 NOTE — PERIOP NOTES
TRANSFER - OUT REPORT:    Verbal report given to RN on Nancy Wang  being transferred to [phone] for routine post - op       Report consisted of patients Situation, Background, Assessment and   Recommendations(SBAR). Time Pre op antibiotic given:1440  Anesthesia Stop time: 5026  Eric Present on Transfer to floor:no  Order for Eric on Chart:no  Discharge Prescriptions with Chart:no    Information from the following report(s) SBAR, OR Summary, Intake/Output and MAR was reviewed with the receiving nurse. Opportunity for questions and clarification was provided. Is the patient on 02? NO       L/Min 0       Other 0    Is the patient on a monitor? YES    Is the nurse transporting with the patient? YES    Surgical Waiting Area notified of patient's transfer from PACU?  YES      The following personal items collected during your admission accompanied patient upon transfer:   Dental Appliance: Dental Appliances: None  Vision: Visual Aid: None  Hearing Aid:    Jewelry:    Clothing: Clothing: (clothing bag with mom)  Other Valuables:    Valuables sent to safe:

## 2019-09-20 NOTE — ANESTHESIA PREPROCEDURE EVALUATION
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

## 2019-09-20 NOTE — ED TRIAGE NOTES
Triage: Patient arrives from his PCP with c/o chest pressure on left side. Patient was referred here due to a small hole that was found on lung. Patient is AXOX4.

## 2019-09-20 NOTE — PROGRESS NOTES
1. Have you been to the ER, urgent care clinic since your last visit? Hospitalized since your last visit? No    2. Have you seen or consulted any other health care providers outside of the [address] [place] [address] since your last visit? Include any pap smears or colon screening. No    Chief Complaint   Patient presents with    Chest Pain     Patient requests x-ray to see if lung has collapsed. Patient states has intermittent pain on left chest which radiates to left side. Patient states surgeon said if patient not having symptoms to go get x-ray first.    Blood pressure 113/67, pulse 79, temperature 98.1 °F (36.7 °C), temperature source Oral, resp. rate 18, height 6' 2\" (1.88 m), weight 127 lb (57.6 kg), SpO2 97 %.

## 2019-09-20 NOTE — ED PROVIDER NOTES
HPI       20y M with hx of marfan-like syndrome here with PTX. Had a spontaneous PTX about 1 year ago. Got a chest tube and was admitted by thoracic surgery. Had been doing well up until 2 days ago when he suddenly felt a pain in the L chest. Pain waxed and waned but today he decided he should get checked out so saw his PMD. Had an outpatient CXR that showed a small apical L PTX. Sent here for further evaluation. Denies shortness of breath. No nausea or vomiting. No fever. This does not feel as badly as the last time it happened. 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       Family History:   Problem Relation Age of Onset    Anxiety Mother     Depression Mother     Hypertension Father     Sleep Apnea Father     Heart Disease Father     Dementia Maternal Grandmother     Diabetes Maternal Grandmother     Cancer Maternal Aunt         colon       Social History     Socioeconomic History    Marital status: SINGLE     Spouse name: Not on file    Number of children: Not on file    Years of education: Not on file    Highest education level: Not on file   Occupational History    Not on file   Social Needs    Financial resource strain: Not on file    Food insecurity:     Worry: Not on file     Inability: Not on file    Transportation needs:     Medical: Not on file     Non-medical: Not on file   Tobacco Use    Smoking status: Never Smoker    Smokeless tobacco: Never Used   Substance and Sexual Activity    Alcohol use: No    Drug use: No    Sexual activity: Never   Lifestyle    Physical activity:     Days per week: Not on file     Minutes per session: Not on file    Stress: Not on file   Relationships    Social connections:     Talks on phone: Not on file     Gets together: Not on file     Attends Mandaen service: Not on file     Active member of club or organization: Not on file     Attends meetings of clubs or organizations: Not on file     Relationship status: Not on file    Intimate partner violence:     Fear of current or ex partner: Not on file     Emotionally abused: Not on file     Physically abused: Not on file     Forced sexual activity: Not on file   Other Topics Concern    Not on file   Social History Narrative    Not on file         ALLERGIES: Egg; Eggshell membrane; and Hazelnut    Review of Systems   Review of Systems   Constitutional: (-) weight loss. HEENT: (-) stiff neck   Eyes: (-) discharge. Respiratory: (-) cough. Cardiovascular: (-) syncope. Gastrointestinal: (-) blood in stool. Genitourinary: (-) hematuria. Musculoskeletal: (-) myalgias. Neurological: (-) seizure. Skin: (-) petechiae  Lymph/Immunologic: (-) enlarged lymph nodes  All other systems reviewed and are negative. Vitals:    09/20/19 1227   Pulse: (!) 115   SpO2: 99%            Physical Exam Physical Exam   Nursing note and vitals reviewed. Constitutional: Appears well-developed and well-nourished. active. No distress. Head: normocephalic, atraumatic  Ears: TM's clear with normal visualization of landmarks. No discharge in the canal, no pain in the canal. No pain with external manipulation of the ear. No mastoid tenderness or swelling. Nose: Nose normal. No nasal discharge. Mouth/Throat: Mucous membranes are moist. No tonsillar enlargement, erythema or exudate. Uvula midline. Eyes: Conjunctivae are normal. Right eye exhibits no discharge. Left eye exhibits no discharge. PERRL bilat. Neck: Normal range of motion. Neck supple. No focal midline neck pain. No cervical lympadenopathy. Cardiovascular: Normal rate, regular rhythm, S1 normal and S2 normal.    No murmur heard. 2+ distal pulses with normal cap refill. Pulmonary/Chest: No respiratory distress. No rales. No rhonchi. No wheezes. Good air exchange throughout. No increased work of breathing. No accessory muscle use. Abdominal: soft and non-tender.  No rebound or guarding. No hernia. No organomegaly. Back: no midline tenderness. No stepoffs or deformities. No CVA tenderness. Extremities/Musculoskeletal: Normal range of motion. no edema, no tenderness, no deformity and no signs of injury. distal extremities are neurovasc intact. Neurological: Alert. normal strength and sensation. normal muscle tone. Skin: Skin is warm and dry. Turgor is normal. No petechiae, no purpura, no rash. No cyanosis. No mottling, jaundice or pallor. MDM 24y M here with small PTX. This is his second spontaneous event in the past year or so. Will touch base with thoracic.              Procedures

## 2019-09-20 NOTE — PROGRESS NOTES
Chief Complaint: chest pressure    Subjective  Kareem Page is a 27 y.o. male who presents for evaluation of left side chest pressure with concerns for \"collapsed lungs. \" Pt reports pressure/discomfort on left side of the chest with deep breath that started 2 days prior to presenting to clinic. Pt is not currently short of breath. Pt had similar symptoms last year which lasted for a week before he went to [facility] ER where he was found to have a left apical pneumothorax s/p chest tube placement. At that time, pt was told he his vascular anatomy does not favour a surgical intervention. He was followed by Dr. Yousif Martell (Thoracic surgeon). Denies any fever, chills, cough, sob, headache or dizziness. Of note, pt has Marfan's syndrome features although genetic testing have been negative. Allergies - reviewed: Allergies   Allergen Reactions    Egg Anaphylaxis    Eggshell Membrane Swelling    Hazelnut Other (comments)         Medications - reviewed:   Current Outpatient Medications   Medication Sig    diphenhydrAMINE (BENADRYL ALLERGY) 25 mg tablet Take 25 mg by mouth as needed (for allergic reaction).  EPINEPHrine (EPIPEN) 0.3 mg/0.3 mL injection 0.3 mg by IntraMUSCular route once as needed.  fluticasone propionate (FLONASE) 50 mcg/actuation nasal spray 2 Sprays by Both Nostrils route daily.  guaifenesin (MUCINEX PO) Take 1 Tab by mouth as needed. No current facility-administered medications for this visit.         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 Constitutional: Fever, chills, night sweats, weight loss, lymphadenopathy, fatigue  HEENT: Vision change, eye pain, rhinorrhea, sinus pain, epistaxis, dysphagia, change in hearing, tinnitus, vertigo. Cardiovascular: Chest pain, palpitations, syncope, lower extremity edema, orthopnea, paroxysmal nocturnal dyspnea  Pulmonary: Shortness of breath, dyspnea on exertion, cough, hemoptysis      Physical Exam  Visit Vitals  /67 (BP 1 Location: Right arm, BP Patient Position: Sitting)   Pulse 79   Temp 98.1 °F (36.7 °C) (Oral)   Resp 18   Ht 6' 2\" (1.88 m)   Wt 127 lb (57.6 kg)   SpO2 97%   BMI 16.31 kg/m²       General: Alert and oriented, in no acute distress. Well nourished. SKIN: No rash. No suspicious lesions or moles. EYE: PERRL. Sclera and conjuctival clear. Extraocular movements intact. NECK: Supple; no masses; thyroid normal.  LUNGS: Respirations unlabored; clear to auscultation bilaterally. CARDIOVASCULAR: Regular, rate, and rhythm without murmurs, gallops or rubs. Assessment/Plan    ICD-10-CM ICD-9-CM    1. Chest pain, unspecified type R07.9 786.50 XR CHEST PA LAT       1. Chest pain, unspecified type  - CXR - small left apical pneumothorax. - Pt referred to go directly to [place] [id number]..   - Notified receiving ER physician of patient's arrival      I have discussed the diagnosis with the patient and the intended plan as seen in the above orders. Patient verbalized understanding of the plan and agrees with the plan. The patient has received an after-visit summary and questions were answered concerning future plans. I have discussed medication side effects and warnings with the patient as well. Informed patient to return to the office if new symptoms arise.     Patient dicussed with Dr. Zeferino Boothe (supervising provider)    Km Cruz MD  Family Medicine Resident

## 2019-09-20 NOTE — ED NOTES
Patient leaving for OR patient removed from clothes. Consent was signed at bedside and scanned into chart.

## 2019-09-20 NOTE — CONSULTS
THoracic Surgery ER Consultation    Admit Date: 9/20/2019  Reason for Consultation: Recurrent Left Pneumothorax    HPI:  Avelino Gann is a 27 y.o. male well-known to our service who we are asked to see in Thoracic Surgery consultation for a recurrent Left pneumothorax. He has beenworking as a  & doing relatively well since we saw him last year for a left PTX. On Wednesday evening (2 days ago) he experienced left sided mid chest pain. The next morning, the left-sided chest pressure increased, without shortness of breath and increased last night while reaching to make his bed. Today the left-sided chest pain persisted, so he went to his PCP office and a CXR was performed (see findings below). He was advised to present to [facility] ED. CXR (9/20/2019) shows:  IMPRESSION:  1. Interval development of a small left apical pneumothorax. Please see above  text. 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 No pertinent surgical history. Social History     Tobacco Use    Smoking status: Never Smoker    Smokeless tobacco: Never Used   Substance Use Topics    Alcohol use: No      Family History   Problem Relation Age of Onset    Anxiety Mother     Depression Mother     Hypertension Father     Sleep Apnea Father     Heart Disease Father     Dementia Maternal Grandmother     Diabetes Maternal Grandmother     Cancer Maternal Aunt         colon      Prior to Admission medications    Medication Sig Start Date End Date Taking? Authorizing Provider   magnesium oxide (MAG-OX) 400 mg tablet Take 400 mg by mouth daily. Yes Provider, Historical   diphenhydrAMINE (BENADRYL ALLERGY) 25 mg tablet Take 25 mg by mouth as needed (for allergic reaction). Provider, Historical   EPINEPHrine (EPIPEN) 0.3 mg/0.3 mL injection 0.3 mg by IntraMUSCular route once as needed. Provider, Historical     Allergies   Allergen Reactions    Egg Anaphylaxis    Eggshell Membrane Swelling    Tree Nut Anaphylaxis    Ciprofloxacin Nausea and Vomiting    Hazelnut Other (comments)    Percocet [Oxycodone-Acetaminophen] Nausea and Vomiting          Subjective:     Review of Systems:    A comprehensive review of systems was negative except for that written in the History of Present Illness. Objective:     Blood pressure 108/60, pulse 81, temperature 97.7 °F (36.5 °C), resp. rate 14, SpO2 99 %. Recent Results (from the past 24 hour(s))   CBC WITH AUTOMATED DIFF    Collection Time: 09/20/19 12:44 PM   Result Value Ref Range    WBC 5.5 4.1 - 11.1 K/uL    RBC 4.72 4.10 - 5.70 M/uL    HGB 14.5 12.1 - 17.0 g/dL    HCT 44.3 36.6 - 50.3 %    MCV 93.9 80.0 - 99.0 FL    MCH 30.7 26.0 - 34.0 PG    MCHC 32.7 30.0 - 36.5 g/dL    RDW 12.2 11.5 - 14.5 %    PLATELET 979 888 - 102 K/uL    MPV 10.6 8.9 - 12.9 FL    NRBC 0.0 0  WBC    ABSOLUTE NRBC 0.00 0.00 - 0.01 K/uL    NEUTROPHILS 59 32 - 75 %    LYMPHOCYTES 32 12 - 49 %    MONOCYTES 7 5 - 13 %    EOSINOPHILS 1 0 - 7 %    BASOPHILS 1 0 - 1 %    IMMATURE GRANULOCYTES 0 0.0 - 0.5 %    ABS. NEUTROPHILS 3.2 1.8 - 8.0 K/UL    ABS. LYMPHOCYTES 1.8 0.8 - 3.5 K/UL    ABS. MONOCYTES 0.4 0.0 - 1.0 K/UL    ABS. EOSINOPHILS 0.1 0.0 - 0.4 K/UL    ABS. BASOPHILS 0.1 0.0 - 0.1 K/UL    ABS. IMM. GRANS. 0.0 0.00 - 0.04 K/UL    DF AUTOMATED     SAMPLES BEING HELD    Collection Time: 09/20/19 12:44 PM   Result Value Ref Range    SAMPLES BEING HELD 1RED,1BLU      COMMENT        Add-on orders for these samples will be processed based on acceptable specimen integrity and analyte stability, which may vary by analyte.     _____________________  Physical Exam:     General:  Alert, cooperative, no distress, appears stated age. Thin. Eyes:   Sclera clear. Throat: Lips, mucosa, and tongue normal.   Neck: Supple, symmetrical, trachea midline. Lungs:   Clear to auscultation bilaterally. No resp distress or intracostal retraction. Heart:  RRR   Abdomen:   Soft, non-tender. Extremities: Extremities thin, atraumatic, no cyanosis or edema. Skin: Skin color, texture normal. No rashes or lesions. Assessment:   Active Problems:    Pneumothorax, left (9/20/2019)        Plan: To OR this afternoon for Left VATS, Apical wedge resection & Mechanical Pleurodesis   NPO  Consent signed  Pre-op labs & antibiotics as ordered. Total time spent with patient: 20 minutes.     Signed By: LUIS A Ann     September 20, 2019

## 2019-09-21 PROCEDURE — 74011250636 HC RX REV CODE- 250/636: Performed by: THORACIC SURGERY (CARDIOTHORACIC VASCULAR SURGERY)

## 2019-09-21 PROCEDURE — 74011250637 HC RX REV CODE- 250/637: Performed by: THORACIC SURGERY (CARDIOTHORACIC VASCULAR SURGERY)

## 2019-09-21 PROCEDURE — 65660000000 HC RM CCU STEPDOWN

## 2019-09-21 RX ADMIN — HYDROMORPHONE HYDROCHLORIDE 2 MG: 2 TABLET ORAL at 07:25

## 2019-09-21 RX ADMIN — KETOROLAC TROMETHAMINE 30 MG: 30 INJECTION, SOLUTION INTRAMUSCULAR at 01:18

## 2019-09-21 RX ADMIN — Medication 10 ML: at 18:54

## 2019-09-21 RX ADMIN — KETOROLAC TROMETHAMINE 30 MG: 30 INJECTION, SOLUTION INTRAMUSCULAR at 18:54

## 2019-09-21 RX ADMIN — KETOROLAC TROMETHAMINE 30 MG: 30 INJECTION, SOLUTION INTRAMUSCULAR at 07:27

## 2019-09-21 RX ADMIN — KETOROLAC TROMETHAMINE 30 MG: 30 INJECTION, SOLUTION INTRAMUSCULAR at 23:32

## 2019-09-21 RX ADMIN — KETOROLAC TROMETHAMINE 30 MG: 30 INJECTION, SOLUTION INTRAMUSCULAR at 12:24

## 2019-09-21 RX ADMIN — Medication 10 ML: at 15:57

## 2019-09-21 NOTE — PROGRESS NOTES
Spiritual Care Partner Volunteer visited patient in Rm [id number] on 9/21/19. Documented by:   Chaplain Russell MDiv, MACE  [phone] PRAY (2023)

## 2019-09-21 NOTE — ROUTINE PROCESS
About 1911, pt had some nausea issue, faint feeling. Pt vomited. Afterward reported feeling better. Kept pt in chair/recliner  Antinausea medication provided. 2003: Pt reported feeling better.

## 2019-09-21 NOTE — PROGRESS NOTES
Problem: Falls - Risk of  Goal: *Absence of Falls  Description  Document Abdulkadirandrei Arguello Fall Risk and appropriate interventions in the flowsheet.   9/21/2019 1911 by Marian Graham  Outcome: Progressing Towards Goal  Note:   Fall Risk Interventions:  Mobility Interventions: Communicate number of staff needed for ambulation/transfer, Utilize walker, cane, or other assistive device, Patient to call before getting OOB         Medication Interventions: Patient to call before getting Zhanna Harder, Teach patient to arise slowly                9/21/2019 1719 by Marian Graham  Outcome: Progressing Towards Goal  Note:   Fall Risk Interventions:  Mobility Interventions: Patient to call before getting OOB         Medication Interventions: Patient to call before getting OOB, Teach patient to arise slowly                   Problem: Patient Education: Go to Patient Education Activity  Goal: Patient/Family Education  Outcome: Progressing Towards Goal     Problem: Lung Procedures/VATS Pathway: Day of Surgery  Goal: *No signs and symptoms of infection or wound complications  Outcome: Progressing Towards Goal

## 2019-09-21 NOTE — ROUTINE PROCESS
Primary Nurse Fatemeh Harley RN and Moe Nevarez RN performed a dual skin assessment on this patient No impairment noted  Sg score is 21    With surgical incision

## 2019-09-21 NOTE — PROGRESS NOTES
Doing well  Minute airleak    AFVSS  CTA anayeli RRR soft NT ND    Continue routine progression for post op  Tube to suction

## 2019-09-21 NOTE — OP NOTES
[facility]  OPERATIVE REPORT    Name:  Mendel Sorenson  MR#:  [id number]  :  1995  ACCOUNT #:  [de-identified]  DATE OF SERVICE:  2019    CLINICAL SERVICE:  Thoracic Surgery. ATTENDING SURGEON:  Gretta Montague MD    PROCEDURES PERFORMED:  1. Left video-assisted thoracoscopy. 2.  Left upper lobe apical wedge resection. 3.  Mechanical pleurodesis. PREOPERATIVE DIAGNOSIS:  Recurrent left spontaneous pneumothorax. POSTOPERATIVE DIAGNOSIS:  Recurrent left spontaneous pneumothorax. FIRST ASSISTANT:  LUIS A Friedman    SPECIMEN SENT:  Left apical wedge was sent to anatomic pathology. DRAINS AND TUBES:  One 28-Prydeinig chest tube was left within the left hemithorax. ANESTHESIA:  General with double-lumen endotracheal intubation. ESTIMATED BLOOD LOSS:  For this case was minimal.    INDICATIONS FOR PROCEDURE:  The patient is a 46-year-old gentleman well known to the Thoracic Surgery Service. In 2018, he presented with a spontaneous left pneumothorax. At that time, we did take him for a left VATS. However, we did not complete mechanical pleurodesis or an apical wedge resection because there was abnormal vasculature on his pleural surface and the surface of his left upper lobe, and there was concern for an AVM. Postoperatively, after that we worked up the AVM which was found to be of no consequence and no major vascularity could be found on CT angiogram.  He was then managed conservatively. He now presents to the ER with a recurrent left spontaneous pneumothorax. PROCEDURE:  After informed consent was obtained and placed on the chart, the patient was taken to the operating room, placed supine on the operating table. General anesthesia with double-lumen endotracheal intubation was induced without complication. Preoperative antibiotics were administered. The patient was then placed in the right lateral decubitus position with left side up.   The patient's left chest was prepped and draped in sterile fashion. Time-out was performed. Through the prior inferior VATS incision, a 10 mm Thoracoport was placed. In addition, posteriorly in the sixth intercostal space and anteriorly in the fourth intercostal space, working ports were placed. The left upper lobe was inspected. There was a small dense cluster of blebs. Using Endo ALPHONSE stapler, a left upper lobe apical wedge resection was performed. This was sent to anatomic pathology. Approximately 60 mL of 1% lidocaine mixed with 0.25% Marcaine was used as local anesthetic to perform intercostal nerve block. Using Bovie scratch pads, mechanical pleurodesis was performed of the anterolateral and posterior parietal pleural surfaces as well as an apical pleurectomy. A 28-Turkmen chest tube was placed posteriorly. The pleural surfaces were then inspected and while there was some oozing, there was no surgical bleeding. Lung was re-inflated under direct visualization. The incisions were then closed in a multilayered fashion and covered with Dermabond. The patient was then reversed from general anesthesia, extubated, and taken to PACU in stable condition. All surgical counts were correct x2 at the end of case. There were no immediate complications identified during this case. Dr. Juanjose Jeronimo was present and scrubbed throughout the entire procedure. Irwin GUNN, was instrumental in completion of this operation as she assisted with opening and closing of all incisions, use of the camera, and use of the Endo ALPHONSE stapler. David Tsai MD      [id number]  D:  09/20/2019 15:55  T:  09/20/2019 16:04  JOB #:  [id number]  CC:   Ashley Luna MD

## 2019-09-22 ENCOUNTER — APPOINTMENT (OUTPATIENT)
Dept: GENERAL RADIOLOGY | Age: 24
DRG: 164 | End: 2019-09-22
Attending: THORACIC SURGERY (CARDIOTHORACIC VASCULAR SURGERY)
Payer: OTHER GOVERNMENT

## 2019-09-22 PROCEDURE — 71045 X-RAY EXAM CHEST 1 VIEW: CPT

## 2019-09-22 PROCEDURE — 65660000000 HC RM CCU STEPDOWN

## 2019-09-22 PROCEDURE — 74011250636 HC RX REV CODE- 250/636: Performed by: THORACIC SURGERY (CARDIOTHORACIC VASCULAR SURGERY)

## 2019-09-22 RX ADMIN — KETOROLAC TROMETHAMINE 30 MG: 30 INJECTION, SOLUTION INTRAMUSCULAR at 17:46

## 2019-09-22 RX ADMIN — Medication 10 ML: at 06:00

## 2019-09-22 RX ADMIN — KETOROLAC TROMETHAMINE 30 MG: 30 INJECTION, SOLUTION INTRAMUSCULAR at 23:58

## 2019-09-22 RX ADMIN — Medication 10 ML: at 14:59

## 2019-09-22 RX ADMIN — KETOROLAC TROMETHAMINE 30 MG: 30 INJECTION, SOLUTION INTRAMUSCULAR at 05:08

## 2019-09-22 RX ADMIN — Medication 10 ML: at 17:50

## 2019-09-22 RX ADMIN — KETOROLAC TROMETHAMINE 30 MG: 30 INJECTION, SOLUTION INTRAMUSCULAR at 12:04

## 2019-09-22 NOTE — ROUTINE PROCESS
Bedside and Verbal shift change report given to Warden Sapp (oncoming nurse) by Jonathan Perera (offgoing nurse). Report included the following information SBAR, Intake/Output and MAR.

## 2019-09-22 NOTE — PROGRESS NOTES
Problem: Lung Procedures/VATS Pathway: Post-Op Day 2  Goal: Activity/Safety  Outcome: Progressing Towards Goal  Goal: *No signs and symptoms of infection or wound complications  Outcome: Progressing Towards Goal  Goal: *Optimal pain control at patient's stated goal  Outcome: Progressing Towards Goal  Goal: *Tolerating diet  Outcome: Progressing Towards Goal  Goal: *Demonstrates progressive activity  Outcome: Progressing Towards Goal

## 2019-09-22 NOTE — PROGRESS NOTES
Problem: Falls - Risk of  Goal: *Absence of Falls  Description  Document Susu Fields Fall Risk and appropriate interventions in the flowsheet. Outcome: Progressing Towards Goal  Note:   Fall Risk Interventions:  Mobility Interventions: Patient to call before getting OOB         Medication Interventions: Patient to call before getting OOB                   Problem: Lung Procedures/VATS Pathway: Post-Op Day 1  Goal: Activity/Safety  Outcome: Progressing Towards Goal  Goal: Diagnostic Test/Procedures  Outcome: Progressing Towards Goal  Goal: Nutrition/Diet  Outcome: Progressing Towards Goal  Goal: Discharge Planning  Outcome: Progressing Towards Goal  Goal: Medications  Outcome: Progressing Towards Goal  Goal: Respiratory  Outcome: Progressing Towards Goal  Goal: Treatments/Interventions/Procedures  Outcome: Progressing Towards Goal  Goal: Psychosocial  Outcome: Progressing Towards Goal  Goal: *No signs and symptoms of infection or wound complications  Outcome: Progressing Towards Goal  Goal: *Optimal pain control at patient's stated goal  Outcome: Progressing Towards Goal  Goal: *Adequate urinary output (equal to or greater than 30 milliliters/hour)  Outcome: Progressing Towards Goal  Goal: *Hemodynamically stable  Outcome: Progressing Towards Goal  Goal: *Tolerating diet  Outcome: Progressing Towards Goal  Goal: *Demonstrates progressive activity  Outcome: Progressing Towards Goal  Goal: *Lungs clear or at baseline  Outcome: Progressing Towards Goal  Goal: *Oxygen saturation within defined limits  Outcome: Progressing Towards Goal     Problem: Falls - Risk of  Goal: *Absence of Falls  Description  Document Susu Gayon Fall Risk and appropriate interventions in the flowsheet.   Outcome: Progressing Towards Goal  Note:   Fall Risk Interventions:  Mobility Interventions: Patient to call before getting OOB         Medication Interventions: Patient to call before getting OOB                   Problem: Lung Procedures/VATS Pathway: Post-Op Day 1  Goal: Activity/Safety  Outcome: Progressing Towards Goal  Goal: Diagnostic Test/Procedures  Outcome: Progressing Towards Goal  Goal: Nutrition/Diet  Outcome: Progressing Towards Goal  Goal: Discharge Planning  Outcome: Progressing Towards Goal  Goal: Medications  Outcome: Progressing Towards Goal  Goal: Respiratory  Outcome: Progressing Towards Goal  Goal: Treatments/Interventions/Procedures  Outcome: Progressing Towards Goal  Goal: Psychosocial  Outcome: Progressing Towards Goal  Goal: *No signs and symptoms of infection or wound complications  Outcome: Progressing Towards Goal  Goal: *Optimal pain control at patient's stated goal  Outcome: Progressing Towards Goal  Goal: *Adequate urinary output (equal to or greater than 30 milliliters/hour)  Outcome: Progressing Towards Goal  Goal: *Hemodynamically stable  Outcome: Progressing Towards Goal  Goal: *Tolerating diet  Outcome: Progressing Towards Goal  Goal: *Demonstrates progressive activity  Outcome: Progressing Towards Goal  Goal: *Lungs clear or at baseline  Outcome: Progressing Towards Goal  Goal: *Oxygen saturation within defined limits  Outcome: Progressing Towards Goal

## 2019-09-22 NOTE — PROGRESS NOTES
Bedside and Verbal shift change report given to Magda Noble and Susan Arenas(oncoming nurse) by Griselda Aburto Rn(offgoing nurse). Report included the following information SBAR, Intake/Output, MAR, Recent Results and Cardiac Rhythm sinus  Rythm.

## 2019-09-22 NOTE — PROGRESS NOTES
Doing well  No airleak today  AFVSS  CTA anayeli RRR soft NT ND    CXR small PTX    Plan  Go to waterseal  CXR in am

## 2019-09-23 ENCOUNTER — HOME HEALTH ADMISSION (OUTPATIENT)
Dept: HOME HEALTH SERVICES | Facility: HOME HEALTH | Age: 24
End: 2019-09-23
Payer: OTHER GOVERNMENT

## 2019-09-23 ENCOUNTER — APPOINTMENT (OUTPATIENT)
Dept: GENERAL RADIOLOGY | Age: 24
DRG: 164 | End: 2019-09-23
Attending: NURSE PRACTITIONER
Payer: OTHER GOVERNMENT

## 2019-09-23 VITALS
BODY MASS INDEX: 16.13 KG/M2 | HEIGHT: 74 IN | SYSTOLIC BLOOD PRESSURE: 117 MMHG | TEMPERATURE: 97.9 F | OXYGEN SATURATION: 99 % | WEIGHT: 125.66 LBS | DIASTOLIC BLOOD PRESSURE: 59 MMHG | HEART RATE: 85 BPM | RESPIRATION RATE: 17 BRPM

## 2019-09-23 PROCEDURE — 71045 X-RAY EXAM CHEST 1 VIEW: CPT

## 2019-09-23 PROCEDURE — 74011250636 HC RX REV CODE- 250/636: Performed by: THORACIC SURGERY (CARDIOTHORACIC VASCULAR SURGERY)

## 2019-09-23 PROCEDURE — 74011250637 HC RX REV CODE- 250/637: Performed by: NURSE PRACTITIONER

## 2019-09-23 RX ORDER — HYDROMORPHONE HYDROCHLORIDE 2 MG/1
2 TABLET ORAL
Qty: 30 TAB | Refills: 0 | Status: SHIPPED | OUTPATIENT
Start: 2019-09-23 | End: 2019-10-07

## 2019-09-23 RX ORDER — AMOXICILLIN 250 MG
1 CAPSULE ORAL DAILY
Qty: 14 TAB | Refills: 0 | Status: SHIPPED | OUTPATIENT
Start: 2019-09-23

## 2019-09-23 RX ORDER — MAGNESIUM CITRATE
148 SOLUTION, ORAL ORAL
Status: COMPLETED | OUTPATIENT
Start: 2019-09-23 | End: 2019-09-23

## 2019-09-23 RX ADMIN — MAGNESIUM CITRATE 148 ML: 1.75 LIQUID ORAL at 09:00

## 2019-09-23 RX ADMIN — Medication 10 ML: at 06:00

## 2019-09-23 RX ADMIN — KETOROLAC TROMETHAMINE 30 MG: 30 INJECTION, SOLUTION INTRAMUSCULAR at 12:17

## 2019-09-23 RX ADMIN — KETOROLAC TROMETHAMINE 30 MG: 30 INJECTION, SOLUTION INTRAMUSCULAR at 05:44

## 2019-09-23 NOTE — PROGRESS NOTES
Problem: Falls - Risk of  Goal: *Absence of Falls  Description  Document Boone Zamorajohann Fall Risk and appropriate interventions in the flowsheet.   Outcome: Progressing Towards Goal  Note:   Fall Risk Interventions:  Mobility Interventions: Patient to call before getting OOB         Medication Interventions: Patient to call before getting OOB                   Problem: Patient Education: Go to Patient Education Activity  Goal: Patient/Family Education  Outcome: Progressing Towards Goal     Problem: Patient Education: Go to Patient Education Activity  Goal: Patient/Family Education  Outcome: Progressing Towards Goal     Problem: Lung Procedures/VATS Pathway: Day of Surgery  Goal: Off Pathway (Use only if patient is Off Pathway)  Outcome: Progressing Towards Goal  Goal: Activity/Safety  Outcome: Progressing Towards Goal  Goal: Nutrition/Diet  Outcome: Progressing Towards Goal  Goal: Medications  Outcome: Progressing Towards Goal  Goal: Respiratory  Outcome: Progressing Towards Goal  Goal: Treatments/Interventions/Procedures  Outcome: Progressing Towards Goal  Goal: Psychosocial  Outcome: Progressing Towards Goal  Goal: *No signs and symptoms of infection or wound complications  Outcome: Progressing Towards Goal  Goal: *Optimal pain control at patient's stated goal  Outcome: Progressing Towards Goal  Goal: *Adequate urinary output (equal to or greater than 30 milliliters/hour)  Outcome: Progressing Towards Goal  Goal: *Hemodynamically stable  Outcome: Progressing Towards Goal  Goal: *Tolerating diet  Outcome: Progressing Towards Goal  Goal: *Demonstrates progressive activity  Outcome: Progressing Towards Goal     Problem: Lung Procedures/VATS Pathway: Post-Op Day 1  Goal: Off Pathway (Use only if patient is Off Pathway)  Outcome: Progressing Towards Goal  Goal: Activity/Safety  Outcome: Progressing Towards Goal  Goal: Diagnostic Test/Procedures  Outcome: Progressing Towards Goal  Goal: Nutrition/Diet  Outcome: Progressing Towards Goal  Goal: Discharge Planning  Outcome: Progressing Towards Goal  Goal: Medications  Outcome: Progressing Towards Goal  Goal: Respiratory  Outcome: Progressing Towards Goal  Goal: Treatments/Interventions/Procedures  Outcome: Progressing Towards Goal  Goal: Psychosocial  Outcome: Progressing Towards Goal  Goal: *No signs and symptoms of infection or wound complications  Outcome: Progressing Towards Goal  Goal: *Optimal pain control at patient's stated goal  Outcome: Progressing Towards Goal  Goal: *Adequate urinary output (equal to or greater than 30 milliliters/hour)  Outcome: Progressing Towards Goal  Goal: *Hemodynamically stable  Outcome: Progressing Towards Goal  Goal: *Tolerating diet  Outcome: Progressing Towards Goal  Goal: *Demonstrates progressive activity  Outcome: Progressing Towards Goal  Goal: *Lungs clear or at baseline  Outcome: Progressing Towards Goal  Goal: *Oxygen saturation within defined limits  Outcome: Progressing Towards Goal     Problem: Lung Procedures/VATS Pathway: Post-Op Day 2  Goal: Off Pathway (Use only if patient is Off Pathway)  Outcome: Progressing Towards Goal  Goal: Activity/Safety  Outcome: Progressing Towards Goal  Goal: Diagnostic Test/Procedures  Outcome: Progressing Towards Goal  Goal: Nutrition/Diet  Outcome: Progressing Towards Goal  Goal: Discharge Planning  Outcome: Progressing Towards Goal  Goal: Medications  Outcome: Progressing Towards Goal  Goal: Respiratory  Outcome: Progressing Towards Goal  Goal: Treatments/Interventions/Procedures  Outcome: Progressing Towards Goal  Goal: Psychosocial  Outcome: Progressing Towards Goal  Goal: *No signs and symptoms of infection or wound complications  Outcome: Progressing Towards Goal  Goal: *Optimal pain control at patient's stated goal  Outcome: Progressing Towards Goal  Goal: *Adequate urinary output (equal to or greater than 30 milliliters/hour)  Outcome: Progressing Towards Goal  Goal: *Hemodynamically stable  Outcome: Progressing Towards Goal  Goal: *Tolerating diet  Outcome: Progressing Towards Goal  Goal: *Demonstrates progressive activity  Outcome: Progressing Towards Goal  Goal: *Lungs clear or at baseline  Outcome: Progressing Towards Goal  Goal: *Oxygen saturation within defined limits  Outcome: Progressing Towards Goal     Problem: Lung Procedures/VATS Pathway: Post-Op Day 3  Goal: Off Pathway (Use only if patient is Off Pathway)  Outcome: Progressing Towards Goal  Goal: Activity/Safety  Outcome: Progressing Towards Goal  Goal: Nutrition/Diet  Outcome: Progressing Towards Goal  Goal: Discharge Planning  Outcome: Progressing Towards Goal  Goal: Medications  Outcome: Progressing Towards Goal  Goal: Respiratory  Outcome: Progressing Towards Goal  Goal: Treatments/Interventions/Procedures  Outcome: Progressing Towards Goal  Goal: Psychosocial  Outcome: Progressing Towards Goal  Goal: *No signs and symptoms of infection or wound complications  Outcome: Progressing Towards Goal  Goal: *Optimal pain control at patient's stated goal  Outcome: Progressing Towards Goal  Goal: *Adequate urinary output (equal to or greater than 30 milliliters/hour)  Outcome: Progressing Towards Goal  Goal: *Hemodynamically stable  Outcome: Progressing Towards Goal  Goal: *Tolerating diet  Outcome: Progressing Towards Goal  Goal: *Demonstrates progressive activity  Outcome: Progressing Towards Goal  Goal: *Lungs clear or at baseline  Outcome: Progressing Towards Goal  Goal: *Oxygen saturation within defined limits  Outcome: Progressing Towards Goal     Problem: Lung Procedures/VATS Pathway  Goal: *Hemodynamically stable  Outcome: Progressing Towards Goal  Goal: *Lungs clear or at baseline  Outcome: Progressing Towards Goal  Goal: *Demonstrates independent activity or return to baseline  Outcome: Progressing Towards Goal  Goal: *Optimal pain control at patient's stated goal  Outcome: Progressing Towards Goal  Goal: *Tolerating diet  Outcome: Progressing Towards Goal  Goal: *Verbalizes name, dosage, time, side effects, and number of days to continue medications  Outcome: Progressing Towards Goal  Goal: *No signs and symptoms of infection or wound complications  Outcome: Progressing Towards Goal  Goal: *Anxiety reduced or absent  Outcome: Progressing Towards Goal  Goal: *Understands and describes signs and symptoms to report to providers(Stroke Metric)  Outcome: Progressing Towards Goal  Goal: *Describes follow-up/return visits to physicians  Outcome: Progressing Towards Goal  Goal: *Describes available resources and support systems  Outcome: Progressing Towards Goal

## 2019-09-23 NOTE — PROGRESS NOTES
Bedside shift change report given to Donny Villafana (oncoming nurse) by Nicanor Owens (offgoing nurse).  Report included the following information SBAR, Attila, MAR.

## 2019-09-23 NOTE — PROGRESS NOTES
Reason for Admission: Pneumothorax,  Left video-assisted thoracoscopy, Left upper lobe apical wedge resection, Mechanical pleurodesis. RRAT Score:  7                   Plan for utilizing home health:  Yes. [address]                        Current Advanced Directive/Advance Care Plan: No ACP in the chart                         Transition of Care Plan:   CM met with patient and his mother to discuss discharge planning. Patient lives with his parents, he is independent and gainfully employed. He had not seen his PCP since last year and uses his local [facility] for [facility]. His mother will provide transportation home. CM noted order for Home Care and offered a choice, they chose [address] The [place] of Choice form was signed and placed in patient's chart. A referral was sent via [facility] to [address]. CM will continue to follow. Jim Grider MSA, RN, CRM. Care Management Interventions  PCP Verified by CM: Yes  Palliative Care Criteria Met (RRAT>21 & CHF Dx)?: No  Mode of Transport at Discharge: Other (see comment)(Private car)  Transition of Care Consult (CM Consult): Discharge Planning, [address]: Yes  MyChart Signup: No  Discharge Durable Medical Equipment: No  Health Maintenance Reviewed: Yes  Physical Therapy Consult: No  Occupational Therapy Consult: No  Speech Therapy Consult: No  Current Support Network:  Other(Lives with his parents)  Confirm Follow Up Transport: Family  Plan discussed with Pt/Family/Caregiver: Yes  Discharge Location  Discharge Placement: Home with family assistance

## 2019-09-23 NOTE — PROGRESS NOTES
Faculty or Preceptor Review of Student Work    9/23/2019  - Shift times - [phone]    The student documentation of patient care for Romayne Bloch has been reviewed and approved. All medications have been administered under the direct supervision of the faculty or preceptor.     Elena Levin RN

## 2019-09-23 NOTE — DISCHARGE INSTRUCTIONS
*DISCHARGE INSTRUCTIONS AFTER LUNG [address] [facility]  [address] [facility] [address] [facility], [address] [place] [address]  [phone]    Leave the chest tube dressing in place for 48 hours, then you can remove it and shower. SURGICAL INCISION:    You will have two or three small incisions. These incisions are sealed with sutures that dissolve. The lower incision is from the chest tube. This lower incision will seal itself in 5 to 7 days. Until then you may have drainage from that incision. The drainage will be thin yellowish or red in color and may drain for 5 to 7 days. This is normal and expected. INCISION CARE:    Wash incisions daily with soap. Pat dry. Showers only, no tub baths. If you have drainage, you can place a bandage over the area, otherwise keep open to air. You may experience drainage of clear-strawberry colored fluid from the chest tube site. This is to be expected and will stop in 24-48 hours. PAIN:    What you will experience:     Tenderness and soreness around incisions   Burning and/or numbness   Soreness around front/back of chest    For relief of discomfort:     Take the pain medicine you were given at discharge   Aleve one or two tablets every 12 hrs (with food) along with pain medicine (this can be purchased over the counter at your local pharmacy/drug store). Advil may be substituted. Start this after you finish taking Toradol if prescribed.  Heating pad 10 minutes at a time to the upper incision area as often as you wish.  For the Ladies: Spandex or elastic sports bra will give you the support you need without pressure on the incision. Most will find this to be a great comfort. COUGHING:    Coughing is helpful after lung surgery. Place a pillow over your incision and apply pressure when coughing to reduce pain. ACTIVITY:    DO: 1. Walk daily outside if weather permits, at a mall if not.  Increase your distance each day. Exercise has many benefits. It promotes healing, expands your lungs,                helps you cough, relaxes your body, tones muscles, lowers blood pressure and               improves your appetite. After you exercise expect to feel tired and probably short                of breath. Plan to take a nap 1-2 times a day to regain your strength . 2. Climb stairs           3. Ride in a car           4. Perform breathing exercises (incentive spirometer)    DO NOT:               1. Lift heavy objects (8-10 pounds)  2. Drive a car/truck until after your post-op visit  3. Do heavy yard or housework     APPETITE:    It is usual to have a decreased appetite after surgery. Exercise is the best stimulant. You may expect to slowly improve over 4-10 days. CALL THE OFFICE IF YOU DEVELOP:     Increasing pain that is not controlled by the pain medicine.  Increasing redness or drainage around the incision.  Unusual or increasing shortness of breath   Fever greater than 101 degrees   Change in the color of your sputum to yellow or green, especially if you also have increasing shortness of breath and a fever greater than 101. YOUR MEDICATIONS:  As instructed        FOLLOW-UP APPOINTMENT:  Future Appointments   Date Time Provider Angela Mcrae   9/26/2019 11:00 AM CLAUDY Christianson       AFTER DISCHARGE CALL THE OFFICE TO SCHEDULE YOUR VISIT TO SEE US IN 2 WEEKS. Please arrive 45 minutes prior to you appointment time in order to have a chest X-Ray. Check in at [address] [facility] on the ground floor of the [facility]. After your X-ray come to the [address] [place] [address] for your appointment.       Physician Signature

## 2019-09-23 NOTE — PROGRESS NOTES
Thoracic Surgery Simple Progress Note    Admit Date: 2019  POD: 3 Days Post-Op      Procedure:  Procedure(s):  THORACOSCOPY VIDEO ASSISTED--APICAL WEDGE RESECTION, AND MECHANICAL PLEURODESIS      Subjective:     Patient has complaints: Constipation    Review of Systems:    CARDIAC: negative  RESP: positive for left PTX  NEURO:  negative  INCISION: Clean, dry, and intact  EXT: Denies new swelling or pain in the legs or calves. Objective:     Blood pressure 114/63, pulse 91, temperature 98.8 °F (37.1 °C), resp. rate 20, height 6' 2\" (1.88 m), weight 125 lb 10.6 oz (57 kg), SpO2 98 %. Temp (24hrs), Av.7 °F (37.1 °C), Min:98.3 °F (36.8 °C), Max:99 °F (37.2 °C)        Hemodynamics    PAP    CO    CI    No intake/output data recorded.  1901 -  0700  In: 1 [P.O.:970]  Out: 600 [Urine:400]    EXAM:  GENERAL: VSS, afrible, alert and cooperative  HEART:  regular rate and rhythm  LUNG: clear to auscultation bilaterally, left sided chest tube in place to water seal, noted air leak with cough   NEURO:  normal without focal findings  mental status, speech normal, alert and oriented x iii  INCISION: Clean, dry, and intact  EXTREMITIES:No evidence of DVT seen on physical exam.  GI/: Abd soft, nonterder with + bowel sounds. No BM since 19 Voiding    Labs:No results found for this or any previous visit (from the past 24 hour(s)). Assessment:   No evidence of DVT.   Active Problems:    Spontaneous pneumothorax (2018)      Pneumothorax, left (2019)        Plan/Recommendations:   Await CXR but plan to leave chest tube in place for now due to air leak with coughing  Half bottle mag citrate this morning   See orders    Signed By: Shanika GREENFIELD

## 2019-09-23 NOTE — DISCHARGE SUMMARY
Physician Discharge Summary     Patient ID:  Mayda Britton  [id number]  47 y.o.  1995    Allergies: Egg; Eggshell membrane; Tree nut; Ciprofloxacin; Hazelnut; and Percocet [oxycodone-acetaminophen]    Admit Date: 9/20/2019    Discharge Date: 9/23/2019    * Admission Diagnoses: Spontaneous pneumothorax [J93.83]    * Discharge Diagnoses:    Hospital Problems as of 9/23/2019 Date Reviewed: 9/20/2019          Codes Class Noted - Resolved POA    Pneumothorax, left ICD-10-CM: J93.9  ICD-9-CM: 512.89  9/20/2019 - Present Unknown        Spontaneous pneumothorax ICD-10-CM: J93.83  ICD-9-CM: 512.89  9/24/2018 - Present Unknown               Admission Condition: Good    * Discharge Condition: good    * Procedures: Procedure(s):  THORACOSCOPY VIDEO ASSISTED--APICAL WEDGE RESECTION, AND MECHANICAL PLEURODESIS    * Hospital Course:   Uneventful hospital course for this procedure. Transitioned to oral analgesics and pain well controlled. He ambulated in halls & used IS frequently. Patient had persistent airleak noted in pleuravac. Pneumostat attached to chest tube and DC to home POD #3. Daily CXR stable. He is doing well with follow up appointments in place. Consults: Case Management for Home Nursing care    Significant Diagnostic Studies: radiology: daily CXR    * Disposition: Home    Discharge Medications:   Discharge Medication List as of 9/23/2019  2:41 PM      START taking these medications    Details   HYDROmorphone (DILAUDID) 2 mg tablet Take 1 Tab by mouth every six (6) hours as needed for Pain for up to 14 days. Max Daily Amount: 8 mg., Print, Disp-30 Tab, R-0      senna-docusate (PERICOLACE) 8.6-50 mg per tablet Take 1 Tab by mouth daily. Indications: constipation, Print, Disp-14 Tab, R-0         CONTINUE these medications which have NOT CHANGED    Details   magnesium oxide (MAG-OX) 400 mg tablet Take 400 mg by mouth daily. , Historical Med      diphenhydrAMINE (BENADRYL ALLERGY) 25 mg tablet Take 25 mg by mouth as needed (for allergic reaction). , Historical Med      EPINEPHrine (EPIPEN) 0.3 mg/0.3 mL injection 0.3 mg by IntraMUSCular route once as needed., Historical Med             * Follow-up Care/Patient Instructions: Activity: No lifting >5#, Driving, or Strenuous exercise for 2 weeks. No driving while on analgesics. Nursing care per [address] [place] [address].  See surgical instructions  Diet: Resume previous diet  Wound Care: Keep wound clean and dry    Follow-up Information     Follow up With Specialties Details Why Contact Info    Darian Dong MD [facility]   [address]  [phone]      Sarah Marquez MD Pediatrics           Follow-up appts:  Future Appointments   Date Time Provider Angela Mcrae   9/25/2019 To Be Determined Karren Boast, RN [address] [facility]   9/26/2019 11:00 AM Max Lima NP [facility]       Signed:  Rock Jose Antonio PA-C  9/23/2019  2:37 PM

## 2019-09-25 ENCOUNTER — HOME CARE VISIT (OUTPATIENT)
Dept: SCHEDULING | Facility: HOME HEALTH | Age: 24
End: 2019-09-25
Payer: OTHER GOVERNMENT

## 2019-09-25 DIAGNOSIS — J93.9 PNEUMOTHORAX ON LEFT: Primary | ICD-10-CM

## 2019-09-25 PROCEDURE — 3331090002 HH PPS REVENUE DEBIT

## 2019-09-25 PROCEDURE — G0299 HHS/HOSPICE OF RN EA 15 MIN: HCPCS

## 2019-09-25 PROCEDURE — 3331090001 HH PPS REVENUE CREDIT

## 2019-09-25 PROCEDURE — 400013 HH SOC

## 2019-09-26 ENCOUNTER — HOSPITAL ENCOUNTER (OUTPATIENT)
Dept: GENERAL RADIOLOGY | Age: 24
Discharge: HOME | End: 2019-09-26
Payer: OTHER GOVERNMENT

## 2019-09-26 ENCOUNTER — OFFICE VISIT (OUTPATIENT)
Dept: SURGERY | Age: 24
End: 2019-09-26

## 2019-09-26 VITALS
BODY MASS INDEX: 16.04 KG/M2 | TEMPERATURE: 97.6 F | SYSTOLIC BLOOD PRESSURE: 104 MMHG | HEART RATE: 111 BPM | RESPIRATION RATE: 18 BRPM | DIASTOLIC BLOOD PRESSURE: 69 MMHG | WEIGHT: 125 LBS | OXYGEN SATURATION: 97 % | HEIGHT: 74 IN

## 2019-09-26 VITALS
HEIGHT: 76 IN | RESPIRATION RATE: 17 BRPM | HEART RATE: 101 BPM | SYSTOLIC BLOOD PRESSURE: 100 MMHG | TEMPERATURE: 98.2 F | DIASTOLIC BLOOD PRESSURE: 62 MMHG | BODY MASS INDEX: 15.22 KG/M2 | OXYGEN SATURATION: 97 % | WEIGHT: 125 LBS

## 2019-09-26 DIAGNOSIS — J93.9 PNEUMOTHORAX ON LEFT: ICD-10-CM

## 2019-09-26 DIAGNOSIS — J93.9 PNEUMOTHORAX ON LEFT: Primary | ICD-10-CM

## 2019-09-26 PROCEDURE — 3331090001 HH PPS REVENUE CREDIT

## 2019-09-26 PROCEDURE — 3331090002 HH PPS REVENUE DEBIT

## 2019-09-26 PROCEDURE — 71046 X-RAY EXAM CHEST 2 VIEWS: CPT

## 2019-09-26 NOTE — PROGRESS NOTES
Pneumostat check. 1. Have you been to the ER, urgent care clinic since your last visit? Hospitalized since your last visit? Yes, [facility] on 9/20/19 for chest pain. 2. Have you seen or consulted any other health care providers outside of the [address] [place] [address] since your last visit? Include any pap smears or colon screening.  No

## 2019-09-26 NOTE — PROGRESS NOTES
Subjective:      Garland Belle is a 27 y.o. male presents for postop care . Procedure:  9/2019: left VATS, apical wedge resection, mechanical pleurodesis by Dr Chinedu Herbert for recurrent pneumothorax    Appetite is good. Eating a regular diet without difficulty. Bowel movements are regular. The patient is voiding without difficulty. The patient is not having any pain. .    Objective:     Visit Vitals  /69 (BP 1 Location: Right arm, BP Patient Position: Sitting)   Pulse (!) 111   Temp 97.6 °F (36.4 °C) (Oral)   Resp 18   Ht 6' 2\" (1.88 m)   Wt 125 lb (56.7 kg)   SpO2 97%   BMI 16.05 kg/m²       Physical Exam:  GENERAL: alert, cooperative, no distress, appears stated age, LUNG: clear to auscultation bilaterally, Chest tube in place with pneumoStat in place. HEART: regular rate and rhythm, ABDOMEN: soft, non-tender. Bowel sounds normal. No masses,  no organomegaly, EXTREMITIES:  extremities normal, atraumatic, no cyanosis or edema, SKIN: Normal.    Data Review CXR 9/26/19  COMPARISON: 9/23/2019 at 0804 hours     TECHNIQUE: PA and lateral chest views     FINDINGS: The left chest tube is stable. The cardiomediastinal contours are  stable. The pulmonary vasculature is within normal limits.      The previously seen left apical pneumothorax is no longer visualized. The lungs  and pleural spaces are clear. The bones and upper abdomen are stable.     IMPRESSION  IMPRESSION:   Stable left chest tube. Clear lungs. Resolved left apical pneumothorax. Assessment:     Patient Active Problem List   Diagnosis Code    Spontaneous pneumothorax J93.83    Anaphylaxis T78. 2XXA    Marfan's syndrome Q87.40    Pneumothorax, left J93.9       Doing well postoperatively. Denies having pain, not taking Rx pain medication. Unable to aspirate fluid from PneumoStat. New pneumoStat attached. Positive air leak with coughing. Chest tube dressing changed, no drainage, intact sutures around chest tube.  Surgical glue intact over other incisions. His Mother has recorded chest tube drainage amounts, average of 50 ml daily. Plan/Recommendations/Medical Decision Making:      Trev Iraheta will be seen again on Monday 9/30/19     We discussed the importance of proper diet and 30 minutes/day of proper exercise. All questions were personally answered. Thank you for allowing us to participate in the care of your patient.     [address]  Thoracic Surgery

## 2019-09-27 ENCOUNTER — HOME CARE VISIT (OUTPATIENT)
Dept: SCHEDULING | Facility: HOME HEALTH | Age: 24
End: 2019-09-27
Payer: OTHER GOVERNMENT

## 2019-09-27 PROCEDURE — G0299 HHS/HOSPICE OF RN EA 15 MIN: HCPCS

## 2019-09-27 PROCEDURE — 3331090001 HH PPS REVENUE CREDIT

## 2019-09-27 PROCEDURE — 3331090002 HH PPS REVENUE DEBIT

## 2019-09-28 ENCOUNTER — HOME CARE VISIT (OUTPATIENT)
Dept: HOME HEALTH SERVICES | Facility: HOME HEALTH | Age: 24
End: 2019-09-28
Payer: OTHER GOVERNMENT

## 2019-09-28 PROCEDURE — 3331090002 HH PPS REVENUE DEBIT

## 2019-09-28 PROCEDURE — 3331090001 HH PPS REVENUE CREDIT

## 2019-09-29 ENCOUNTER — HOME CARE VISIT (OUTPATIENT)
Dept: SCHEDULING | Facility: HOME HEALTH | Age: 24
End: 2019-09-29
Payer: OTHER GOVERNMENT

## 2019-09-29 VITALS
TEMPERATURE: 99 F | DIASTOLIC BLOOD PRESSURE: 58 MMHG | HEART RATE: 100 BPM | SYSTOLIC BLOOD PRESSURE: 102 MMHG | OXYGEN SATURATION: 99 %

## 2019-09-29 PROCEDURE — G0299 HHS/HOSPICE OF RN EA 15 MIN: HCPCS

## 2019-09-29 PROCEDURE — 3331090002 HH PPS REVENUE DEBIT

## 2019-09-29 PROCEDURE — 3331090001 HH PPS REVENUE CREDIT

## 2019-09-30 ENCOUNTER — OFFICE VISIT (OUTPATIENT)
Dept: SURGERY | Age: 24
End: 2019-09-30

## 2019-09-30 ENCOUNTER — HOSPITAL ENCOUNTER (OUTPATIENT)
Dept: GENERAL RADIOLOGY | Age: 24
Discharge: HOME | End: 2019-09-30
Payer: OTHER GOVERNMENT

## 2019-09-30 VITALS
TEMPERATURE: 98.4 F | RESPIRATION RATE: 18 BRPM | WEIGHT: 125 LBS | BODY MASS INDEX: 16.04 KG/M2 | HEIGHT: 74 IN | SYSTOLIC BLOOD PRESSURE: 111 MMHG | DIASTOLIC BLOOD PRESSURE: 68 MMHG | OXYGEN SATURATION: 97 % | HEART RATE: 108 BPM

## 2019-09-30 DIAGNOSIS — J93.9 PNEUMOTHORAX ON LEFT: Primary | ICD-10-CM

## 2019-09-30 DIAGNOSIS — J93.9 PNEUMOTHORAX ON LEFT: ICD-10-CM

## 2019-09-30 PROCEDURE — 3331090001 HH PPS REVENUE CREDIT

## 2019-09-30 PROCEDURE — 3331090002 HH PPS REVENUE DEBIT

## 2019-09-30 PROCEDURE — 71046 X-RAY EXAM CHEST 2 VIEWS: CPT

## 2019-09-30 RX ORDER — CHOLECALCIFEROL (VITAMIN D3) 125 MCG
1 CAPSULE ORAL DAILY
COMMUNITY

## 2019-09-30 NOTE — PROGRESS NOTES
Pneumostat check. 1. Have you been to the ER, urgent care clinic since your last visit? Hospitalized since your last visit? No    2. Have you seen or consulted any other health care providers outside of the [address] [place] [address] since your last visit? Include any pap smears or colon screening.  No

## 2019-09-30 NOTE — LETTER
NOTIFICATION RETURN TO WORK  
 
9/30/2019 3:06 PM 
 
Mr. Mayda Britton [facility] Pending sale to [facility] [phone] To Whom It May Concern: 
 
Mayda Britton is currently under the care of Saim Messina. He will return to work on: Monday, October 14, 2019. He cannot lift more than 10# until November 4, 2019. If there are questions or concerns please have the patient contact our office. Sincerely, Christiano Oakes NP

## 2019-09-30 NOTE — PATIENT INSTRUCTIONS
*DISCHARGE INSTRUCTIONS AFTER LUNG SURGERY [address] [facility] [address], [address] Leave the chest tube dressing in place for 48 hours (10/02/19), then you can remove it and shower. SURGICAL INCISION: 
 
You will have two or three small incisions. These incisions are sealed with sutures that dissolve. The lower incision is from the chest tube. This lower incision will seal itself in 5 to 7 days. Until then you may have drainage from that incision. The drainage will be thin yellowish or red in color and may drain for 5 to 7 days. This is normal and expected. INCISION CARE: 
 
Wash incisions daily with soap. Pat dry. Showers only, no tub baths. If you have drainage, you can place a bandage over the area, otherwise keep open to air. You may experience drainage of clear-strawberry colored fluid from the chest tube site. This is to be expected and will stop in 24-48 hours. PAIN: 
 
What you will experience: ? Tenderness and soreness around incisions ? Burning and/or numbness ? Soreness around front/back of chest 
 
For relief of discomfort: ? Take the pain medicine you were given at discharge ? Aleve one or two tablets every 12 hrs (with food) along with pain medicine (this can be purchased over the counter at your local pharmacy/drug store). Advil may be substituted. Start this after you finish taking Toradol if prescribed. ? Heating pad 10 minutes at a time to the upper incision area as often as you wish. ? For the Ladies: Spandex or elastic sports bra will give you the support you need without pressure on the incision. Most will find this to be a great comfort. COUGHING: 
 
Coughing is helpful after lung surgery. Place a pillow over your incision and apply pressure when coughing to reduce pain. ACTIVITY: 
 
DO: 1. Walk daily outside if weather permits, at a mall if not. Increase your distance each day. Exercise has many benefits. It promotes healing, expands your lungs,  
             helps you cough, relaxes your body, tones muscles, lowers blood pressure and  
            improves your appetite. After you exercise expect to feel tired and probably short  
             of breath. Plan to take a nap 1-2 times a day to regain your strength . 2. Climb stairs 3. Ride in a car 4. Perform breathing exercises (incentive spirometer) DO NOT: 
            
1. Lift heavy objects (8-10 pounds) 2. Drive a car/truck until after your post-op visit 3. Do heavy yard or housework APPETITE: It is usual to have a decreased appetite after surgery. Exercise is the best stimulant. You may expect to slowly improve over 4-10 days. CALL THE OFFICE IF YOU DEVELOP: 
 
? Increasing pain that is not controlled by the pain medicine. ? Increasing redness or drainage around the incision. ? Unusual or increasing shortness of breath ? Fever greater than 101 degrees ? Change in the color of your sputum to yellow or green, especially if you also have increasing shortness of breath and a fever greater than 101. YOUR MEDICATIONS: 
As instructed Physician Signature

## 2019-09-30 NOTE — ANESTHESIA POSTPROCEDURE EVALUATION
Post-Anesthesia Evaluation and Assessment    Patient: Jaye Enrique MRN: [id number]  SSN: [id number]    YOB: 1995  Age: 27 y.o. Sex: male       Cardiovascular Function/Vital Signs  Visit Vitals  /59 (BP 1 Location: Right arm, BP Patient Position: At rest)   Pulse 85   Temp 36.6 °C (97.9 °F)   Resp 17   Ht 6' 2\" (1.88 m)   Wt 57 kg (125 lb 10.6 oz)   SpO2 99%   BMI 16.13 kg/m²       Patient is status post General anesthesia for Procedure(s):  THORACOSCOPY VIDEO ASSISTED--APICAL WEDGE RESECTION, AND MECHANICAL PLEURODESIS. Nausea/Vomiting: None    Postoperative hydration reviewed and adequate. Pain:  Pain Scale 1: Numeric (0 - 10) (09/23/19 0824)  Pain Intensity 1: 0 (09/23/19 0824)   Managed    Neurological Status:   Neuro (WDL): Within Defined Limits (09/20/19 1705)  Neuro  Neurologic State: Alert (09/23/19 0800)  LUE Motor Response: Purposeful (09/23/19 0800)  LLE Motor Response: Purposeful (09/23/19 0800)  RUE Motor Response: Purposeful (09/23/19 0800)  RLE Motor Response: Purposeful (09/23/19 0800)   At baseline    Mental Status and Level of Consciousness: Alert and oriented to person, place, and time    Pulmonary Status:   O2 Device: Room air (09/23/19 0800)   Adequate oxygenation and airway patent    Complications related to anesthesia: None    Post-anesthesia assessment completed. No concerns    Signed By: Estrellita Kaufman MD     September 30, 2019              Procedure(s):  THORACOSCOPY VIDEO ASSISTED--APICAL WEDGE RESECTION, AND MECHANICAL PLEURODESIS.     general    <BSHSIANPOST>    Vitals Value Taken Time   /60 9/20/2019  5:15 PM   Temp 36.4 °C (97.5 °F) 9/20/2019  5:05 PM   Pulse 79 9/20/2019  5:15 PM   Resp 12 9/20/2019  5:15 PM   SpO2 98 % 9/20/2019  5:15 PM

## 2019-09-30 NOTE — PROGRESS NOTES
Subjective:      Romayne Bloch is a 27 y.o. male presents for postop care . Procedure:  9/2019: left VATS, apical wedge resection, mechanical pleurodesis by Dr Cielo Street for recurrent pneumothorax     Appetite is good. Eating a regular diet without difficulty. Bowel movements are regular. The patient is voiding without difficulty. The patient is not having any pain. .    Objective:     Visit Vitals  /68 (BP 1 Location: Right arm, BP Patient Position: Sitting)   Pulse (!) 108   Temp 98.4 °F (36.9 °C) (Oral)   Resp 18   Ht 6' 2\" (1.88 m)   Wt 125 lb (56.7 kg)   SpO2 97%   BMI 16.05 kg/m²       Physical Exam:  GENERAL: alert, cooperative, no distress, appears stated age, LUNG: clear to auscultation bilaterally, left sided chest tube in place with pneumoStat attached. No air leak detected with coughing, HEART: regular rate and rhythm, ABDOMEN: soft, non-tender. Bowel sounds normal. No masses,  no organomegaly, EXTREMITIES:  extremities normal, atraumatic, no cyanosis or edema, SKIN: Normal.    Data Review images and reports reviewed with Dr Cielo Street    Assessment:     Patient Active Problem List   Diagnosis Code    Spontaneous pneumothorax J93.83    Anaphylaxis T78. 2XXA    Marfan's syndrome Q87.40    Pneumothorax, left J93.9       Doing well postoperatively. Postoperative course complicated by continued air leak from chest tube, now resolved. Chest tube removed without incidence. He wanted to see the chest tube length once it was out, he then had  a vasovagal response with light headness, hypotension and diaphoresis. He recovered after about 5 minutes. Sent for stat CXR to evaluate for possible PTX. Returned from Xray no PTX but he dose have an area of cloudiness in the Right lung. Instructed to continue to use the IS and cough and deep breath. Given a letter for work. Plan/Recommendations/Medical Decision Making:      Romayne Bloch will be followed as needed.     We discussed the importance of proper diet and 30 minutes/day of proper exercise. All questions were personally answered. Thank you for allowing us to participate in the care of your patient.     Dion [address]  Thoracic Surgery

## 2019-10-01 VITALS
HEART RATE: 110 BPM | RESPIRATION RATE: 15 BRPM | DIASTOLIC BLOOD PRESSURE: 60 MMHG | OXYGEN SATURATION: 99 % | SYSTOLIC BLOOD PRESSURE: 108 MMHG | TEMPERATURE: 98.4 F

## 2019-10-01 PROCEDURE — 3331090001 HH PPS REVENUE CREDIT

## 2019-10-01 PROCEDURE — 3331090002 HH PPS REVENUE DEBIT

## 2019-10-02 ENCOUNTER — HOME CARE VISIT (OUTPATIENT)
Dept: SCHEDULING | Facility: HOME HEALTH | Age: 24
End: 2019-10-02
Payer: OTHER GOVERNMENT

## 2019-10-02 ENCOUNTER — TELEPHONE (OUTPATIENT)
Dept: SURGERY | Age: 24
End: 2019-10-02

## 2019-10-02 VITALS
HEART RATE: 92 BPM | SYSTOLIC BLOOD PRESSURE: 104 MMHG | DIASTOLIC BLOOD PRESSURE: 60 MMHG | TEMPERATURE: 97.7 F | OXYGEN SATURATION: 98 % | RESPIRATION RATE: 14 BRPM

## 2019-10-02 PROCEDURE — G0299 HHS/HOSPICE OF RN EA 15 MIN: HCPCS

## 2019-10-02 PROCEDURE — 3331090002 HH PPS REVENUE DEBIT

## 2019-10-02 PROCEDURE — 3331090001 HH PPS REVENUE CREDIT

## 2019-10-02 NOTE — TELEPHONE ENCOUNTER
Patient advised, as per Quinn Zhang, that he cannot go swimming for 2 weeks. The patient verbalized understanding.

## 2019-10-02 NOTE — TELEPHONE ENCOUNTER
Patient called to find out if he can get into a swimming pool. He knows of his limitations as far as lifting, but wants to know about getting into a pool and swimming. I advised him that I would s/w Luis Sandoval and call back once I received an answer. The patient verbalized understanding.

## 2019-10-03 PROCEDURE — 3331090002 HH PPS REVENUE DEBIT

## 2019-10-03 PROCEDURE — 3331090001 HH PPS REVENUE CREDIT

## 2019-10-04 ENCOUNTER — HOME CARE VISIT (OUTPATIENT)
Dept: SCHEDULING | Facility: HOME HEALTH | Age: 24
End: 2019-10-04
Payer: OTHER GOVERNMENT

## 2019-10-04 PROCEDURE — 3331090002 HH PPS REVENUE DEBIT

## 2019-10-04 PROCEDURE — 3331090001 HH PPS REVENUE CREDIT

## 2019-10-04 PROCEDURE — G0299 HHS/HOSPICE OF RN EA 15 MIN: HCPCS

## 2019-10-05 ENCOUNTER — HOME CARE VISIT (OUTPATIENT)
Dept: SCHEDULING | Facility: HOME HEALTH | Age: 24
End: 2019-10-05
Payer: OTHER GOVERNMENT

## 2019-10-09 VITALS
HEART RATE: 103 BPM | OXYGEN SATURATION: 97 % | SYSTOLIC BLOOD PRESSURE: 108 MMHG | TEMPERATURE: 97.4 F | DIASTOLIC BLOOD PRESSURE: 78 MMHG

## 2019-10-10 ENCOUNTER — OFFICE VISIT (OUTPATIENT)
Dept: FAMILY MEDICINE CLINIC | Age: 24
End: 2019-10-10

## 2019-10-10 VITALS
TEMPERATURE: 98.1 F | SYSTOLIC BLOOD PRESSURE: 109 MMHG | OXYGEN SATURATION: 94 % | WEIGHT: 125 LBS | HEIGHT: 74 IN | DIASTOLIC BLOOD PRESSURE: 71 MMHG | HEART RATE: 118 BPM | RESPIRATION RATE: 16 BRPM | BODY MASS INDEX: 16.04 KG/M2

## 2019-10-10 DIAGNOSIS — J93.83 SPONTANEOUS PNEUMOTHORAX: Primary | ICD-10-CM

## 2019-10-10 DIAGNOSIS — Z23 ENCOUNTER FOR IMMUNIZATION: ICD-10-CM

## 2019-10-10 NOTE — PROGRESS NOTES
Arvin Negro is a 27 y.o. male who presents today for follow up from hospitalization for spontaneous pneumothorax now s/p  VATS with apical wedge resection with mechanical pleurodesis. Dr. Rosalia Tran did the surgery. Pt has followed up with tisha  and reports no further follow up is need at this point. Pt reports that he is doing fine and denies any SOB, CP, dizziness. Has cough and congestion after surgery but that has improved      Pt had genetics w/u at [facility] for Marfan's syndrome and continues to [facility]. Reports that he tested negative on screening. Does report that he has seem optho and had Echo done for screening. Data reviewed or ordered today:       Other problems include:  Patient Active Problem List   Diagnosis Code    Spontaneous pneumothorax J93.83    Anaphylaxis T78. 2XXA    Marfan's syndrome Q87.40    Pneumothorax, left J93.9       Medications:  Current Outpatient Medications   Medication Sig Dispense Refill    zinc 50 mg tab tablet Take  by mouth daily.  cholecalciferol, vitamin D3, (VITAMIN D3) 2,000 unit tab Take 1 Tab by mouth daily.  magnesium oxide (MAG-OX) 400 mg tablet Take 400 mg by mouth daily.  diphenhydrAMINE (BENADRYL ALLERGY) 25 mg tablet Take 25 mg by mouth as needed (for allergic reaction).  EPINEPHrine (EPIPEN) 0.3 mg/0.3 mL injection 0.3 mg by IntraMUSCular route once as needed.  senna-docusate (PERICOLACE) 8.6-50 mg per tablet Take 1 Tab by mouth daily. Indications: constipation 14 Tab 0       Allergies: Allergies   Allergen Reactions    Egg Anaphylaxis    Eggshell Membrane Swelling    Tree Nut Anaphylaxis    Ciprofloxacin Nausea and Vomiting    Hazelnut Other (comments)    Percocet [Oxycodone-Acetaminophen] Nausea and Vomiting       LMP:  No LMP for male patient.     Social History     Socioeconomic History    Marital status: SINGLE     Spouse name: Not on file    Number of children: Not on file    Years of education: Not on file    Highest education level: Not on file   Occupational History    Not on file   Social Needs    Financial resource strain: Not on file    Food insecurity:     Worry: Not on file     Inability: Not on file    Transportation needs:     Medical: Not on file     Non-medical: Not on file   Tobacco Use    Smoking status: Never Smoker    Smokeless tobacco: Never Used   Substance and Sexual Activity    Alcohol use: No    Drug use: No    Sexual activity: Never   Lifestyle    Physical activity:     Days per week: Not on file     Minutes per session: Not on file    Stress: Not on file   Relationships    Social connections:     Talks on phone: Not on file     Gets together: Not on file     Attends Restoration service: Not on file     Active member of club or organization: Not on file     Attends meetings of clubs or organizations: Not on file     Relationship status: Not on file    Intimate partner violence:     Fear of current or ex partner: Not on file     Emotionally abused: Not on file     Physically abused: Not on file     Forced sexual activity: Not on file   Other Topics Concern    Not on file   Social History Narrative    Not on file       Family History   Problem Relation Age of Onset    Anxiety Mother     Depression Mother     Hypertension Father     Sleep Apnea Father     Heart Disease Father     Dementia Maternal Grandmother     Diabetes Maternal Grandmother     Cancer Maternal Aunt         colon           ROS:  Fever: no  Weight loss: no  Headaches:  no  Chest Pain:  no  SOB:  no  Cough:  no  Other significant ROS:        Physical Exam  Visit Vitals  /71 (BP 1 Location: Left arm, BP Patient Position: Sitting)   Pulse (!) 118   Temp 98.1 °F (36.7 °C) (Oral)   Resp 16   Ht 6' 2\" (1.88 m)   Wt 125 lb (56.7 kg)   SpO2 94%   BMI 16.05 kg/m²     Constitutional: Appears well,  No acute distress, tall/thin male   Psychiatric:   Affect normal, Alert and Oriented to person/place/time  Eyes:   Pupils equally round and reactive, EOMI, conjunctiva clear  Neck:   General inspection and Thyroid normal.  No abnormal cervical or supraclavicular nodes. Chest: pectus carinatum   Lungs:   Clear to auscultation, good respiratory effort, no wheezes, rales or rhonchi  Heart:   tachycardic, Normal S1 and S2,  Regular rhythm. No cardiac murmurs. No carotid bruits. Chest wall normal  Extremities: without edema, good peripheral pulses  Skin: Warm to palpation, without rashes, bruising, or suspicious lesions       BP Readings from Last 3 Encounters:   10/10/19 109/71   10/04/19 108/78   10/02/19 104/60       Assessment/Plan:      ICD-10-CM ICD-9-CM    1. Spontaneous pneumothorax J93.83 512.89    2. Encounter for immunization Z23 V03.89 PA IMMUNIZ ADMIN,1 SINGLE/COMB VAC/TOXOID      TETANUS, DIPHTHERIA TOXOIDS AND ACELLULAR PERTUSSIS VACCINE (TDAP), IN INDIVIDS. >=7, IM       1. Spontaneous pneumothorax- resolved s/p VATS with wedge resection w/ pleurodesis , appears to be having an uncomplicated post op course   - continue regular follow up     2. 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 >=7, IM    Orders Placed This Encounter    PA IMMUNIZ ADMIN,1 SINGLE/COMB VAC/TOXOID    TETANUS, DIPHTHERIA TOXOIDS AND ACELLULAR PERTUSSIS VACCINE (TDAP), IN INDIVIDS. >=7, IM    zinc 50 mg tab tablet     Sig: Take  by mouth daily.          Karyn Blandon MD  [address] [facility] Medicine Residency Stage 2: Additional Anesthesia Type: 1% lidocaine with epinephrine and a 1:10 solution of 8.4% sodium bicarbonate 08-Feb-2019 22:14

## 2019-10-10 NOTE — PROGRESS NOTES
28 yo male here for follow up for spontaneous pneumothorax    Second pneumothorax he has had    S/p VATs procedure    I reviewed with the resident the medical history and the resident's findings on the physical examination. I discussed with the resident the patient's diagnosis and concur with the plan.

## 2019-10-24 ENCOUNTER — TELEPHONE (OUTPATIENT)
Dept: FAMILY MEDICINE CLINIC | Age: 24
End: 2019-10-24

## 2019-10-24 ENCOUNTER — TELEPHONE (OUTPATIENT)
Dept: SURGERY | Age: 24
End: 2019-10-24

## 2019-10-24 NOTE — TELEPHONE ENCOUNTER
----- Message from Srinivasa Mcnair sent at 10/24/2019  7:28 AM EDT -----  Regarding: Dr. Ilda Duarte, mother would like to bring son in to get x-ray done on his lungs. Best contact number is [phone].

## 2019-10-24 NOTE — TELEPHONE ENCOUNTER
Spoke with Kareem this morning. His Mother had called stating that Ryan Opitz was having \"lung pain with deep breaths\". Ryan Opitz states he has discomfort when using the IS in the middle of his left lung. He denies SOB, cough, or pain at any other time. After discussing this with Dr Sofia Duarte, I instructed him to stop using the IS as it's been a month since his surgery and he no longer needs to use it. If he continues to have the discomfort after 24 hrs to call us back. He stated he understood.

## 2020-03-02 ENCOUNTER — OFFICE VISIT (OUTPATIENT)
Dept: FAMILY MEDICINE CLINIC | Age: 25
End: 2020-03-02

## 2020-03-02 VITALS
OXYGEN SATURATION: 97 % | DIASTOLIC BLOOD PRESSURE: 66 MMHG | SYSTOLIC BLOOD PRESSURE: 114 MMHG | TEMPERATURE: 97.9 F | RESPIRATION RATE: 16 BRPM | BODY MASS INDEX: 16.94 KG/M2 | WEIGHT: 132 LBS | HEIGHT: 74 IN | HEART RATE: 90 BPM

## 2020-03-02 DIAGNOSIS — M54.6 THORACIC BACK PAIN, UNSPECIFIED BACK PAIN LATERALITY, UNSPECIFIED CHRONICITY: Primary | ICD-10-CM

## 2020-03-02 DIAGNOSIS — Z87.09 HISTORY OF PNEUMOTHORAX: ICD-10-CM

## 2020-03-02 LAB
BILIRUB UR QL STRIP: NEGATIVE
GLUCOSE UR-MCNC: NEGATIVE MG/DL
KETONES P FAST UR STRIP-MCNC: NORMAL MG/DL
PH UR STRIP: 7 [PH] (ref 4.6–8)
PROT UR QL STRIP: NEGATIVE
SP GR UR STRIP: 1.02 (ref 1–1.03)
UA UROBILINOGEN AMB POC: NORMAL (ref 0.2–1)
URINALYSIS CLARITY POC: CLEAR
URINALYSIS COLOR POC: YELLOW
URINE BLOOD POC: NEGATIVE
URINE LEUKOCYTES POC: NEGATIVE
URINE NITRITES POC: NEGATIVE

## 2020-03-02 RX ORDER — IBUPROFEN 200 MG
TABLET ORAL
COMMUNITY

## 2020-03-02 NOTE — PROGRESS NOTES
Chief Complaint   Patient presents with    Back Pain     1. Have you been to the ER, urgent care clinic since your last visit? Hospitalized since your last visit? No    2. Have you seen or consulted any other health care providers outside of the [address] [place] [address] since your last visit? Include any pap smears or colon screening.  No

## 2020-03-02 NOTE — PROGRESS NOTES
Gretchen Deshpande is a 23 y.o. male    Issues discussed today include:    Chief Complaint   Patient presents with    Back Pain       1) R mid-low back pain:  Started yesterday. Had been coughing with a head cold this last week. Came bc has h/o spontaneous pneuthorax x 2 in the last few years and did not want to ignore his symptoms in case he had a recurrence. Pain seems to be worse with bending or stretching of his trunk. Denies dyspnea, chest pain. No tx tried. Mother stats genetic testing for Marfan's was negative, but they are still having cardiac surveillance. Had echo, was normal per mom. Data reviewed or ordered today:       Other problems include:  Patient Active Problem List   Diagnosis Code    Spontaneous pneumothorax J93.83    Anaphylaxis T78. 2XXA    Marfan's syndrome Q87.40    Pneumothorax, left J93.9       Medications:  Current Outpatient Medications   Medication Sig Dispense Refill    ibuprofen (MOTRIN IB) 200 mg tablet Take  by mouth.  zinc 50 mg tab tablet Take  by mouth daily.  cholecalciferol, vitamin D3, (VITAMIN D3) 2,000 unit tab Take 1 Tab by mouth daily.  senna-docusate (PERICOLACE) 8.6-50 mg per tablet Take 1 Tab by mouth daily. Indications: constipation 14 Tab 0    magnesium oxide (MAG-OX) 400 mg tablet Take 400 mg by mouth daily.  diphenhydrAMINE (BENADRYL ALLERGY) 25 mg tablet Take 25 mg by mouth as needed (for allergic reaction).  EPINEPHrine (EPIPEN) 0.3 mg/0.3 mL injection 0.3 mg by IntraMUSCular route once as needed for Allergic Response. Allergies: Allergies   Allergen Reactions    Egg Anaphylaxis    Eggshell Membrane Swelling    Tree Nut Anaphylaxis    Ciprofloxacin Nausea and Vomiting    Hazelnut Other (comments)    Percocet [Oxycodone-Acetaminophen] Nausea and Vomiting       LMP:  No LMP for male patient.     Social History     Socioeconomic History    Marital status: SINGLE     Spouse name: Not on file    Number of children: Not on file    Years of education: Not on file    Highest education level: Not on file   Occupational History    Not on file   Social Needs    Financial resource strain: Not on file    Food insecurity     Worry: Not on file     Inability: Not on file    Transportation needs     Medical: Not on file     Non-medical: Not on file   Tobacco Use    Smoking status: Never Smoker    Smokeless tobacco: Never Used   Substance and Sexual Activity    Alcohol use: No    Drug use: No    Sexual activity: Never   Lifestyle    Physical activity     Days per week: Not on file     Minutes per session: Not on file    Stress: Not on file   Relationships    Social connections     Talks on phone: Not on file     Gets together: Not on file     Attends Mu-ism service: Not on file     Active member of club or organization: Not on file     Attends meetings of clubs or organizations: Not on file     Relationship status: Not on file    Intimate partner violence     Fear of current or ex partner: Not on file     Emotionally abused: Not on file     Physically abused: Not on file     Forced sexual activity: Not on file   Other Topics Concern    Not on file   Social History Narrative    Not on file       Family History   Problem Relation Age of Onset    Anxiety Mother     Depression Mother     Hypertension Father     Sleep Apnea Father     Heart Disease Father     Dementia Maternal Grandmother     Diabetes Maternal Grandmother     Cancer Maternal Aunt         colon         Physical Exam   Visit Vitals  /66 (BP 1 Location: Left arm, BP Patient Position: Sitting)   Pulse 90   Temp 97.9 °F (36.6 °C) (Oral)   Resp 16   Ht 6' 2\" (1.88 m)   Wt 132 lb (59.9 kg)   SpO2 97%   BMI 16.95 kg/m²      BP Readings from Last 3 Encounters:   03/02/20 114/66   10/10/19 109/71   10/04/19 108/78     Constitutional: Appears well,  No acute distress, Vitals noted  Psychiatric:  Affect normal, Alert and Oriented to person/place/time  Eyes:  Conjunctiva clear, no drainage  ENT:  External ears and nose normal, Mucous membranes moist. TMs grey and non-bulging bilaterally. OP with clear exudate, no erythema or edema. Bilateral nares without active drainage, edema or polyps. Heart:  Normal HR, Normal S1 and S2,  Regular rhythm. No murmurs, rubs or gallops. Lungs:  Clear to auscultation, good respiratory effort, no wheezes, rales or rhonchi  MSK: R sided anterolateral ribs mildly tender  Back: no CVAT  Skin:  Warm to palpation, without rashes    CXR PA/Lat: personally viewed images and agree with radiologist, no PTX, lungs clear, pleural lining variation in L apex likely from prior VATS    Assessment/Plan:      ICD-10-CM ICD-9-CM    1. Thoracic back pain, unspecified back pain laterality, unspecified chronicity M54.6 724.1 XR CHEST PA LAT      AMB POC URINALYSIS DIP STICK AUTO W/O MICRO   2. History of pneumothorax Z87.09 V12.69 XR CHEST PA LAT       Pt, mother and myself reassured that no PTX on imaging. Pt feels relatively well without dyspnea, pleuritic chest pain and he looks well, VS wnl, lungs clear with good air movement throughout  UA neg for blood and no CVAT, so nephrolithiasis/pyeloneohritis very unlikely  Add flonase to help with post nasal drainage/cough  RTC for any worsening, new sxs - pt knows to go to ER for severe sxs    E.  Michel Stahl MD

## 2021-06-10 ENCOUNTER — TELEPHONE (OUTPATIENT)
Dept: SURGERY | Age: 26
End: 2021-06-10

## 2021-06-10 NOTE — TELEPHONE ENCOUNTER
Patient's mother called asking of it was okay for the patient to have the 2nd COVID-19 vaccine. He has already had the first one, but she saw on the news where the second one can cause inflammation of the aorta. I advised Ms. Julian Needs that I know nothing about this and if she is concerned she should s/w the patient's PCP. She thanked me and stated that she would do so.

## 2021-09-28 ENCOUNTER — TELEPHONE (OUTPATIENT)
Dept: FAMILY MEDICINE CLINIC | Age: 26
End: 2021-09-28

## 2021-09-28 NOTE — TELEPHONE ENCOUNTER
Per call from [facility] states that medical release was faxed yesterday to [phone]. No release on file at call. Asked that he judith back on Thursday to check. If not on file, ask that he re-fax to office. He understands.

## 2021-09-30 NOTE — TELEPHONE ENCOUNTER
Andria from [address] [place] [address]    Called and asked if the patient had current records. Was given last visit date. She said that was not current enough and would call the patient.

## 2021-10-01 NOTE — TELEPHONE ENCOUNTER
Marco Conde with [facility] asking to verify if pt has had a PFT, VQ or Bronchoscopy on file?     Call [phone]

## 2021-10-04 NOTE — TELEPHONE ENCOUNTER
Called [facility] intake at # provided and left a message stating I do not have record of these tests and that I received a records request asking for office notes and these tests from the last year and the patient has not been seen since 3/2/20. I also received a faxed records request that I sent back with a cover sheet stating patient not seen since 3/2/20.

## 2022-03-18 PROBLEM — Q87.40 MARFAN'S SYNDROME: Status: ACTIVE | Noted: 2019-01-16

## 2022-03-19 PROBLEM — J93.9 PNEUMOTHORAX, LEFT: Status: ACTIVE | Noted: 2019-09-20

## 2022-03-19 PROBLEM — T78.2XXA ANAPHYLAXIS: Status: ACTIVE | Noted: 2018-10-03

## 2022-03-20 PROBLEM — J93.83 SPONTANEOUS PNEUMOTHORAX: Status: ACTIVE | Noted: 2018-09-24

## 2022-12-22 NOTE — PROGRESS NOTES
[address] [facility] [address] [place] [address]   Office [phone], Fax [phone]    Subjective:     Chief Complaint   Patient presents with    Medication Refill     Epi-Pen       History provided by patient     Ruby Ellis is a 24 y.o. male presents for epi pen refill. Allergies to eggs and tree nuts. Has never needed to use an epi pen previously. Has had feelings of throat getting scratchy/difficulty swallowing previously when accidentally ate eggs/nuts. Typically takes benadryl with relief of symptoms but would like an epi pen on stand by in case he needs it. Medication reviewed. Current Outpatient Medications:     EPINEPHrine (EPIPEN) 0.3 mg/0.3 mL injection, 0.3 mL by IntraMUSCular route as needed for Allergic Response., Disp: 1 Each, Rfl: 3    ibuprofen (MOTRIN IB) 200 mg tablet, Take  by mouth., Disp: , Rfl:     zinc 50 mg tab tablet, Take  by mouth daily. , Disp: , Rfl:     cholecalciferol, vitamin D3, (VITAMIN D3) 2,000 unit tab, Take 1 Tab by mouth daily. , Disp: , Rfl:     senna-docusate (PERICOLACE) 8.6-50 mg per tablet, Take 1 Tab by mouth daily. Indications: constipation, Disp: 14 Tab, Rfl: 0    magnesium oxide (MAG-OX) 400 mg tablet, Take 400 mg by mouth daily. , Disp: , Rfl:     diphenhydrAMINE (BENADRYL ALLERGY) 25 mg tablet, Take 25 mg by mouth as needed (for allergic reaction). , Disp: , Rfl:      Allergy reviewed.   Allergies   Allergen Reactions    Egg Anaphylaxis    Eggshell Membrane Swelling    Tree Nut Anaphylaxis    Ciprofloxacin Nausea and Vomiting    Hazelnut Other (comments)    Percocet [Oxycodone-Acetaminophen] Nausea and Vomiting        Past Medical History:   Diagnosis Date    Anaphylaxis due to eggs     Ill-defined condition     spontaneous pneumothorax 2018    Ill-defined condition     marfan's syndrome    Pectus carinatum     PUD (peptic ulcer disease)     2015       Social History     Socioeconomic History    Marital status: SINGLE   Tobacco Use    Smoking status: Never    Smokeless tobacco: Never   Substance and Sexual Activity    Alcohol use: No    Drug use: No    Sexual activity: Never       ROS:  General/Constitutional:  No headache, fever, fatigue, weight loss or weight gain     Eyes:  No redness, pain, visual changes    Neck:  No stiffness, pain, or limited movement  Cardiac:   No chest pain    Respiratory:  No cough or shortness of breath    Neurological:  No dizziness, memory changes,    Skin: No rash     Objective:   Vitals - reviewed  Visit Vitals  /66 (BP 1 Location: Left upper arm, BP Patient Position: Sitting, BP Cuff Size: Adult)   Pulse 75   Temp 98.5 °F (36.9 °C) (Oral)   Resp 18   Ht 6' 2\" (1.88 m)   Wt 179 lb (81.2 kg)   SpO2 97%   BMI 22.98 kg/m²        Physical exam:   GEN: NAD. Alert. Well nourished. EYES:  Conjunctiva clear  OROPHYARYNX: No oral lesions or exudates. NECK:  Supple; no masses; thyroid normal           LUNGS: Respirations unlabored; CTAB. no wheeze, rales, rhonchi   CARDIOVASCULAR: Regular, rate, and rhythm without murmurs, gallops or rubs   MSK:Good tone. EXT: Well perfused. No edema. No erythema. PSYCH: appropriate mood and affect. Good insight and judgement. Cooperative. SKIN: No obvious rashes or lesions. Assessment and orders:       ICD-10-CM ICD-9-CM    1. Allergy, subsequent encounter  T78.40XD V58.89 EPINEPHrine (EPIPEN) 0.3 mg/0.3 mL injection        Allergy  - Refilled epi pen      Follow up when available for annual physical. Patient lives in [facility] currently and is here visiting family for a short term but is planning to move back to [address] [facility] in the next year. Pt was discussed with Dr Akosua Kwong (attending physician). I have reviewed patient medical and social history and medications. I have reviewed pertinent labs results and other data. I have discussed the diagnosis with the patient and the intended plan as seen in the above orders.  The patient has received an after-visit summary and questions were answered concerning future plans. I have discussed medication side effects and warnings with the patient as well.     Hassel Simmonds, DO  Resident [facility]  12/23/22

## 2022-12-23 ENCOUNTER — OFFICE VISIT (OUTPATIENT)
Dept: FAMILY MEDICINE CLINIC | Age: 27
End: 2022-12-23
Payer: COMMERCIAL

## 2022-12-23 VITALS
SYSTOLIC BLOOD PRESSURE: 122 MMHG | WEIGHT: 179 LBS | RESPIRATION RATE: 18 BRPM | OXYGEN SATURATION: 97 % | DIASTOLIC BLOOD PRESSURE: 66 MMHG | TEMPERATURE: 98.5 F | BODY MASS INDEX: 22.97 KG/M2 | HEART RATE: 75 BPM | HEIGHT: 74 IN

## 2022-12-23 DIAGNOSIS — T78.40XD ALLERGY, SUBSEQUENT ENCOUNTER: Primary | ICD-10-CM

## 2022-12-23 RX ORDER — EPINEPHRINE 0.3 MG/.3ML
0.3 INJECTION SUBCUTANEOUS AS NEEDED
Qty: 1 EACH | Refills: 3 | Status: SHIPPED | OUTPATIENT
Start: 2022-12-23

## 2022-12-23 NOTE — PROGRESS NOTES
Identified Patient with two Patient identifiers (Name and ). Two Patient Identifiers confirmed. Reviewed record in preparation for visit and have obtained necessary documentation. Chief Complaint   Patient presents with    Medication Refill     Epi-Pen       Visit Vitals  /66 (BP 1 Location: Left upper arm, BP Patient Position: Sitting, BP Cuff Size: Adult)   Pulse 75   Temp 98.5 °F (36.9 °C) (Oral)   Resp 18   Ht 6' 2\" (1.88 m)   Wt 179 lb (81.2 kg)   SpO2 97%   BMI 22.98 kg/m²       1. Have you been to the ER, urgent care clinic since your last visit? Hospitalized since your last visit? No    2. Have you seen or consulted any other health care providers outside of the [address] [place] [address] since your last visit? Include any pap smears or colon screening.  No

## 2022-12-26 NOTE — PROGRESS NOTES
[address] Medicine Residency Attending Addendum:  Patient encounter was discussed on the day of the encounter with Jl Lucas DO, performing the key elements of the service. I discussed the findings, assessment and plan with the resident and agree with the resident's findings and plan as documented in the resident's note.       Shayy Purdy MD, CAQSM, RMSK

## (undated) DEVICE — SUTURE VCRL SZ 3-0 L27IN ABSRB UD L26MM SH 1/2 CIR J416H

## (undated) DEVICE — SUTURE VCRL SZ 4-0 L27IN ABSRB UD L19MM PS-2 3/8 CIR PRIM J426H

## (undated) DEVICE — NDL PRT INJ NSAF BLNT 18GX1.5 --

## (undated) DEVICE — INFECTION CONTROL KIT SYS

## (undated) DEVICE — GOWN,SIRUS,NONRNF,SETINSLV,2XL,18/CS: Brand: MEDLINE

## (undated) DEVICE — STRAP,POSITIONING,KNEE/BODY,FOAM,4X60": Brand: MEDLINE

## (undated) DEVICE — APPLICATOR BNDG 1MM ADH PREMIERPRO EXOFIN

## (undated) DEVICE — SUTURE VCRL SZ 3-0 L18IN ABSRB UD PS-2 L19MM 3/8 CRV PRIM J497H

## (undated) DEVICE — Device

## (undated) DEVICE — MAGNETIC DRAPE: Brand: DEVON

## (undated) DEVICE — TROCAR: Brand: KII® SLEEVE

## (undated) DEVICE — SYR 10ML LUER LOK 1/5ML GRAD --

## (undated) DEVICE — (D)PREP SKN CHLRAPRP APPL 26ML -- CONVERT TO ITEM 371833

## (undated) DEVICE — TOWEL SURG W17XL27IN STD BLU COT NONFENESTRATED PREWASHED

## (undated) DEVICE — NEEDLE SPNL 22GA L3.5IN BLK HUB S STL REG WALL FIT STYL W/

## (undated) DEVICE — SPONGE,DRAIN,NONWVN,4"X4",6PLY,STRL,LF: Brand: MEDLINE

## (undated) DEVICE — SURGICAL PROCEDURE KIT GEN LAPAROSCOPY LF

## (undated) DEVICE — KENDALL SCD EXPRESS SLEEVES, KNEE LENGTH, MEDIUM: Brand: KENDALL SCD

## (undated) DEVICE — PROTECTOR E TIP CLR PLAS TB

## (undated) DEVICE — VISUALIZATION SYSTEM: Brand: CLEARIFY

## (undated) DEVICE — BLADE ELECTRODE: Brand: EDGE

## (undated) DEVICE — YANKAUER,BULB TIP,W/O VENT,RIGID,STERILE: Brand: MEDLINE

## (undated) DEVICE — DRAPE,REIN 53X77,STERILE: Brand: MEDLINE

## (undated) DEVICE — TELFA ADHESIVE ISLAND DRESSING: Brand: TELFA

## (undated) DEVICE — HANDLE LT SNAP ON ULT DURABLE LENS FOR TRUMPF ALC DISPOSABLE

## (undated) DEVICE — NEEDLE HYPO 22GA L1.5IN BLK S STL HUB POLYPR SHLD REG BVL

## (undated) DEVICE — TISSUE RETRIEVAL SYSTEM: Brand: INZII RETRIEVAL SYSTEM

## (undated) DEVICE — DEVON™ KNEE AND BODY STRAP 60" X 3" (1.5 M X 7.6 CM): Brand: DEVON

## (undated) DEVICE — SPONGE TONSIL MED X RAY DETECTABLE STRL LTX FREE

## (undated) DEVICE — GAUZE SPONGES,12 PLY: Brand: CURITY

## (undated) DEVICE — RELOAD STPL 45MM THCK TISS GRN W/ GRIPPING SURF TECHNOLOGY

## (undated) DEVICE — TROCAR: Brand: KII SLEEVE

## (undated) DEVICE — TIP SUCT BLU PLAS BLB W/O CTRL VENT YANK

## (undated) DEVICE — UNIVERSAL STAPLER: Brand: ENDO GIA ULTRA

## (undated) DEVICE — MAGNETIC INSTR DRAPE 20X16: Brand: MEDLINE INDUSTRIES, INC.

## (undated) DEVICE — 1200 GUARD II KIT W/5MM TUBE W/O VAC TUBE: Brand: GUARDIAN

## (undated) DEVICE — SUTURE PERMA-HAND 0 L18IN NONABSORBABLE BLK CT-1 L36MM 1/2 C021D

## (undated) DEVICE — Z CONVERTED USE 2271148 CONNECTOR TBNG POLYPR 5IN1 TOUGH SHATTERPROOF FOR 5-11MM TB

## (undated) DEVICE — I.V. DRAIN SPONGES: Brand: DERMACEA

## (undated) DEVICE — TUBING, SUCTION, 1/4" X 10', STRAIGHT: Brand: MEDLINE

## (undated) DEVICE — SOLUTION IRRIG 1000ML H2O STRL BLT

## (undated) DEVICE — SPONGE GZ W4XL4IN COT 12 PLY TYP VII WVN C FLD DSGN

## (undated) DEVICE — PAD BD MATTRESS 73X32 IN STD CONVOLUTED FOAM LTX FREE

## (undated) DEVICE — STERILE POLYISOPRENE POWDER-FREE SURGICAL GLOVES WITH EMOLLIENT COATING: Brand: PROTEXIS

## (undated) DEVICE — PAD 05IN BASE 3IN PEAK M DENS CONVOLUTED FOAM

## (undated) DEVICE — 3M™ IOBAN™ 2 ANTIMICROBIAL INCISE DRAPE 6648EZ: Brand: IOBAN™ 2

## (undated) DEVICE — TROCAR: Brand: KII FIOS FIRST ENTRY

## (undated) DEVICE — SUTURE SZ 0 27IN 5/8 CIR UR-6  TAPER PT VIOLET ABSRB VICRYL J603H

## (undated) DEVICE — SOLUTION IV 1000ML 0.9% SOD CHL

## (undated) DEVICE — CATHETER THORACENTESIS STR 28 FRX23 IN 6 EYELET TAPR TIP LF

## (undated) DEVICE — REM POLYHESIVE ADULT PATIENT RETURN ELECTRODE: Brand: VALLEYLAB

## (undated) DEVICE — MEDI-VAC NON-CONDUCTIVE SUCTION TUBING: Brand: CARDINAL HEALTH

## (undated) DEVICE — GARMENT,MEDLINE,DVT,INT,CALF,MED, GEN2: Brand: MEDLINE

## (undated) DEVICE — ARTICULATING RELOAD WITH TRI-STAPLE TECHNOLOGY: Brand: ENDO GIA

## (undated) DEVICE — LAPAROSCOPIC TROCAR SLEEVE/SINGLE USE: Brand: KII® OPTICAL ACCESS SYSTEM

## (undated) DEVICE — CONNECTOR TBNG WHT PLAS SUCT STR 5IN1 LTWT W/ M CONN